# Patient Record
Sex: FEMALE | Race: WHITE | NOT HISPANIC OR LATINO | Employment: OTHER | URBAN - METROPOLITAN AREA
[De-identification: names, ages, dates, MRNs, and addresses within clinical notes are randomized per-mention and may not be internally consistent; named-entity substitution may affect disease eponyms.]

---

## 2017-06-15 ENCOUNTER — RECORDS - HEALTHEAST (OUTPATIENT)
Dept: LAB | Facility: CLINIC | Age: 77
End: 2017-06-15

## 2017-06-15 LAB
CHOLEST SERPL-MCNC: 219 MG/DL
FASTING STATUS PATIENT QL REPORTED: YES
HDLC SERPL-MCNC: 72 MG/DL
LDLC SERPL CALC-MCNC: 129 MG/DL
TRIGL SERPL-MCNC: 91 MG/DL

## 2018-12-11 ENCOUNTER — RECORDS - HEALTHEAST (OUTPATIENT)
Dept: LAB | Facility: CLINIC | Age: 78
End: 2018-12-11

## 2018-12-11 LAB
ALBUMIN SERPL-MCNC: 4.1 G/DL (ref 3.5–5)
ALP SERPL-CCNC: 106 U/L (ref 45–120)
ALT SERPL W P-5'-P-CCNC: 18 U/L (ref 0–45)
ANION GAP SERPL CALCULATED.3IONS-SCNC: 9 MMOL/L (ref 5–18)
AST SERPL W P-5'-P-CCNC: 26 U/L (ref 0–40)
BILIRUB SERPL-MCNC: 0.7 MG/DL (ref 0–1)
BUN SERPL-MCNC: 15 MG/DL (ref 8–28)
CALCIUM SERPL-MCNC: 9.6 MG/DL (ref 8.5–10.5)
CHLORIDE BLD-SCNC: 100 MMOL/L (ref 98–107)
CHOLEST SERPL-MCNC: 219 MG/DL
CO2 SERPL-SCNC: 28 MMOL/L (ref 22–31)
CREAT SERPL-MCNC: 0.84 MG/DL (ref 0.6–1.1)
FASTING STATUS PATIENT QL REPORTED: NO
GFR SERPL CREATININE-BSD FRML MDRD: >60 ML/MIN/1.73M2
GLUCOSE BLD-MCNC: 102 MG/DL (ref 70–125)
HDLC SERPL-MCNC: 84 MG/DL
LDLC SERPL CALC-MCNC: 119 MG/DL
POTASSIUM BLD-SCNC: 4 MMOL/L (ref 3.5–5)
PROT SERPL-MCNC: 7.7 G/DL (ref 6–8)
SODIUM SERPL-SCNC: 137 MMOL/L (ref 136–145)
TRIGL SERPL-MCNC: 79 MG/DL

## 2020-01-17 ENCOUNTER — RECORDS - HEALTHEAST (OUTPATIENT)
Dept: LAB | Facility: CLINIC | Age: 80
End: 2020-01-17

## 2020-01-17 LAB
ALBUMIN SERPL-MCNC: 3.7 G/DL (ref 3.5–5)
ALP SERPL-CCNC: 91 U/L (ref 45–120)
ALT SERPL W P-5'-P-CCNC: 14 U/L (ref 0–45)
ANION GAP SERPL CALCULATED.3IONS-SCNC: 9 MMOL/L (ref 5–18)
AST SERPL W P-5'-P-CCNC: 21 U/L (ref 0–40)
BILIRUB SERPL-MCNC: 0.4 MG/DL (ref 0–1)
BUN SERPL-MCNC: 11 MG/DL (ref 8–28)
CALCIUM SERPL-MCNC: 8.7 MG/DL (ref 8.5–10.5)
CHLORIDE BLD-SCNC: 104 MMOL/L (ref 98–107)
CO2 SERPL-SCNC: 26 MMOL/L (ref 22–31)
CREAT SERPL-MCNC: 0.72 MG/DL (ref 0.6–1.1)
GFR SERPL CREATININE-BSD FRML MDRD: >60 ML/MIN/1.73M2
GLUCOSE BLD-MCNC: 96 MG/DL (ref 70–125)
POTASSIUM BLD-SCNC: 4.5 MMOL/L (ref 3.5–5)
PROT SERPL-MCNC: 6.8 G/DL (ref 6–8)
SODIUM SERPL-SCNC: 139 MMOL/L (ref 136–145)

## 2021-04-15 ENCOUNTER — RECORDS - HEALTHEAST (OUTPATIENT)
Dept: LAB | Facility: CLINIC | Age: 81
End: 2021-04-15

## 2021-04-15 LAB
ALBUMIN SERPL-MCNC: 4.2 G/DL (ref 3.5–5)
ALP SERPL-CCNC: 77 U/L (ref 45–120)
ALT SERPL W P-5'-P-CCNC: 11 U/L (ref 0–45)
ANION GAP SERPL CALCULATED.3IONS-SCNC: 14 MMOL/L (ref 5–18)
AST SERPL W P-5'-P-CCNC: 20 U/L (ref 0–40)
BILIRUB SERPL-MCNC: 0.4 MG/DL (ref 0–1)
BUN SERPL-MCNC: 12 MG/DL (ref 8–28)
CALCIUM SERPL-MCNC: 8.8 MG/DL (ref 8.5–10.5)
CHLORIDE BLD-SCNC: 99 MMOL/L (ref 98–107)
CHOLEST SERPL-MCNC: 218 MG/DL
CO2 SERPL-SCNC: 22 MMOL/L (ref 22–31)
CREAT SERPL-MCNC: 0.82 MG/DL (ref 0.6–1.1)
FASTING STATUS PATIENT QL REPORTED: ABNORMAL
GFR SERPL CREATININE-BSD FRML MDRD: >60 ML/MIN/1.73M2
GLUCOSE BLD-MCNC: 100 MG/DL (ref 70–125)
HDLC SERPL-MCNC: 66 MG/DL
LDLC SERPL CALC-MCNC: 134 MG/DL
POTASSIUM BLD-SCNC: 4.3 MMOL/L (ref 3.5–5)
PROT SERPL-MCNC: 7.3 G/DL (ref 6–8)
SODIUM SERPL-SCNC: 135 MMOL/L (ref 136–145)
TRIGL SERPL-MCNC: 90 MG/DL

## 2021-04-16 LAB — 25(OH)D3 SERPL-MCNC: 26.5 NG/ML (ref 30–80)

## 2021-05-25 ENCOUNTER — RECORDS - HEALTHEAST (OUTPATIENT)
Dept: ADMINISTRATIVE | Facility: CLINIC | Age: 81
End: 2021-05-25

## 2021-05-26 ENCOUNTER — RECORDS - HEALTHEAST (OUTPATIENT)
Dept: ADMINISTRATIVE | Facility: CLINIC | Age: 81
End: 2021-05-26

## 2021-05-27 ENCOUNTER — RECORDS - HEALTHEAST (OUTPATIENT)
Dept: ADMINISTRATIVE | Facility: CLINIC | Age: 81
End: 2021-05-27

## 2021-05-28 ENCOUNTER — RECORDS - HEALTHEAST (OUTPATIENT)
Dept: ADMINISTRATIVE | Facility: CLINIC | Age: 81
End: 2021-05-28

## 2021-05-29 ENCOUNTER — RECORDS - HEALTHEAST (OUTPATIENT)
Dept: ADMINISTRATIVE | Facility: CLINIC | Age: 81
End: 2021-05-29

## 2021-05-30 ENCOUNTER — RECORDS - HEALTHEAST (OUTPATIENT)
Dept: ADMINISTRATIVE | Facility: CLINIC | Age: 81
End: 2021-05-30

## 2021-06-12 ENCOUNTER — COMMUNICATION - HEALTHEAST (OUTPATIENT)
Dept: SCHEDULING | Facility: CLINIC | Age: 81
End: 2021-06-12

## 2021-06-12 ENCOUNTER — APPOINTMENT (OUTPATIENT)
Dept: PHYSICAL THERAPY | Facility: CLINIC | Age: 81
DRG: 536 | End: 2021-06-12
Attending: INTERNAL MEDICINE
Payer: MEDICARE

## 2021-06-12 ENCOUNTER — APPOINTMENT (OUTPATIENT)
Dept: GENERAL RADIOLOGY | Facility: CLINIC | Age: 81
DRG: 536 | End: 2021-06-12
Attending: ORTHOPAEDIC SURGERY
Payer: MEDICARE

## 2021-06-12 ENCOUNTER — TRANSFERRED RECORDS (OUTPATIENT)
Dept: HEALTH INFORMATION MANAGEMENT | Facility: CLINIC | Age: 81
End: 2021-06-12

## 2021-06-12 ENCOUNTER — HOSPITAL ENCOUNTER (INPATIENT)
Facility: CLINIC | Age: 81
LOS: 3 days | Discharge: NURSING, REHAB OR LONG-TERM CARE | DRG: 536 | End: 2021-06-16
Attending: INTERNAL MEDICINE | Admitting: INTERNAL MEDICINE
Payer: MEDICARE

## 2021-06-12 DIAGNOSIS — S32.89XA CLOSED FRACTURE OF OTHER PARTS OF PELVIS, INITIAL ENCOUNTER (H): Primary | ICD-10-CM

## 2021-06-12 PROBLEM — F41.1 ANXIETY STATE: Status: ACTIVE | Noted: 2021-06-12

## 2021-06-12 PROBLEM — E78.5 HYPERLIPIDEMIA: Status: ACTIVE | Noted: 2021-06-12

## 2021-06-12 PROBLEM — M81.0 OSTEOPOROSIS: Status: ACTIVE | Noted: 2021-06-12

## 2021-06-12 PROBLEM — I10 BENIGN ESSENTIAL HYPERTENSION: Status: ACTIVE | Noted: 2021-06-12

## 2021-06-12 PROBLEM — S32.9XXA PELVIC FRACTURE (H): Status: ACTIVE | Noted: 2021-06-12

## 2021-06-12 PROBLEM — G89.29 CHRONIC PAIN: Status: ACTIVE | Noted: 2021-06-12

## 2021-06-12 PROBLEM — H04.123 DRY EYES: Status: ACTIVE | Noted: 2021-06-12

## 2021-06-12 PROBLEM — F33.9 RECURRENT MAJOR DEPRESSION (H): Status: ACTIVE | Noted: 2021-06-12

## 2021-06-12 PROBLEM — H91.90 HEARING LOSS: Status: ACTIVE | Noted: 2021-06-12

## 2021-06-12 PROCEDURE — 250N000013 HC RX MED GY IP 250 OP 250 PS 637: Performed by: INTERNAL MEDICINE

## 2021-06-12 PROCEDURE — 97161 PT EVAL LOW COMPLEX 20 MIN: CPT | Mod: GP | Performed by: PHYSICAL THERAPIST

## 2021-06-12 PROCEDURE — 97530 THERAPEUTIC ACTIVITIES: CPT | Mod: GP | Performed by: PHYSICAL THERAPIST

## 2021-06-12 PROCEDURE — 99220 PR INITIAL OBSERVATION CARE,LEVEL III: CPT | Performed by: INTERNAL MEDICINE

## 2021-06-12 PROCEDURE — G0378 HOSPITAL OBSERVATION PER HR: HCPCS

## 2021-06-12 PROCEDURE — 72170 X-RAY EXAM OF PELVIS: CPT

## 2021-06-12 RX ORDER — ACETAMINOPHEN 325 MG/1
650 TABLET ORAL EVERY 4 HOURS PRN
Status: DISCONTINUED | OUTPATIENT
Start: 2021-06-12 | End: 2021-06-16 | Stop reason: HOSPADM

## 2021-06-12 RX ORDER — PAROXETINE 20 MG/1
20 TABLET, FILM COATED ORAL EVERY MORNING
COMMUNITY

## 2021-06-12 RX ORDER — TRIAMTERENE/HYDROCHLOROTHIAZID 37.5-25 MG
1 TABLET ORAL DAILY
Status: DISCONTINUED | OUTPATIENT
Start: 2021-06-12 | End: 2021-06-13

## 2021-06-12 RX ORDER — NALOXONE HYDROCHLORIDE 0.4 MG/ML
0.2 INJECTION, SOLUTION INTRAMUSCULAR; INTRAVENOUS; SUBCUTANEOUS
Status: DISCONTINUED | OUTPATIENT
Start: 2021-06-12 | End: 2021-06-16 | Stop reason: HOSPADM

## 2021-06-12 RX ORDER — ONDANSETRON 2 MG/ML
4 INJECTION INTRAMUSCULAR; INTRAVENOUS EVERY 6 HOURS PRN
Status: DISCONTINUED | OUTPATIENT
Start: 2021-06-12 | End: 2021-06-16 | Stop reason: HOSPADM

## 2021-06-12 RX ORDER — PAROXETINE 20 MG/1
20 TABLET, FILM COATED ORAL DAILY
Status: DISCONTINUED | OUTPATIENT
Start: 2021-06-12 | End: 2021-06-16 | Stop reason: HOSPADM

## 2021-06-12 RX ORDER — NALOXONE HYDROCHLORIDE 0.4 MG/ML
0.4 INJECTION, SOLUTION INTRAMUSCULAR; INTRAVENOUS; SUBCUTANEOUS
Status: DISCONTINUED | OUTPATIENT
Start: 2021-06-12 | End: 2021-06-16 | Stop reason: HOSPADM

## 2021-06-12 RX ORDER — ALENDRONATE SODIUM 70 MG/1
70 TABLET ORAL
COMMUNITY

## 2021-06-12 RX ORDER — TRIAMTERENE AND HYDROCHLOROTHIAZIDE 37.5; 25 MG/1; MG/1
1 CAPSULE ORAL EVERY MORNING
COMMUNITY

## 2021-06-12 RX ORDER — ACETAMINOPHEN 650 MG/1
650 SUPPOSITORY RECTAL EVERY 4 HOURS PRN
Status: DISCONTINUED | OUTPATIENT
Start: 2021-06-12 | End: 2021-06-16 | Stop reason: HOSPADM

## 2021-06-12 RX ORDER — OXYCODONE HYDROCHLORIDE 5 MG/1
5 TABLET ORAL EVERY 4 HOURS PRN
Status: DISCONTINUED | OUTPATIENT
Start: 2021-06-12 | End: 2021-06-16 | Stop reason: HOSPADM

## 2021-06-12 RX ORDER — ATORVASTATIN CALCIUM 10 MG/1
10 TABLET, FILM COATED ORAL DAILY
COMMUNITY

## 2021-06-12 RX ORDER — ATORVASTATIN CALCIUM 10 MG/1
10 TABLET, FILM COATED ORAL EVERY EVENING
Status: DISCONTINUED | OUTPATIENT
Start: 2021-06-12 | End: 2021-06-16 | Stop reason: HOSPADM

## 2021-06-12 RX ORDER — ONDANSETRON 4 MG/1
4 TABLET, ORALLY DISINTEGRATING ORAL EVERY 6 HOURS PRN
Status: DISCONTINUED | OUTPATIENT
Start: 2021-06-12 | End: 2021-06-16 | Stop reason: HOSPADM

## 2021-06-12 RX ADMIN — TRIAMTERENE AND HYDROCHLOROTHIAZIDE 1 TABLET: 37.5; 25 TABLET ORAL at 09:03

## 2021-06-12 RX ADMIN — OXYCODONE HYDROCHLORIDE 5 MG: 5 TABLET ORAL at 20:21

## 2021-06-12 RX ADMIN — ACETAMINOPHEN 650 MG: 325 TABLET, FILM COATED ORAL at 20:21

## 2021-06-12 RX ADMIN — ATORVASTATIN CALCIUM 10 MG: 10 TABLET, FILM COATED ORAL at 20:20

## 2021-06-12 RX ADMIN — PAROXETINE HYDROCHLORIDE 20 MG: 20 TABLET, FILM COATED ORAL at 09:03

## 2021-06-12 RX ADMIN — OXYCODONE HYDROCHLORIDE 5 MG: 5 TABLET ORAL at 09:03

## 2021-06-12 ASSESSMENT — COLUMBIA-SUICIDE SEVERITY RATING SCALE - C-SSRS
2. HAVE YOU ACTUALLY HAD ANY THOUGHTS OF KILLING YOURSELF IN THE PAST MONTH?: NO
1. IN THE PAST MONTH, HAVE YOU WISHED YOU WERE DEAD OR WISHED YOU COULD GO TO SLEEP AND NOT WAKE UP?: NO

## 2021-06-12 NOTE — PLAN OF CARE
[facility]      OUTPATIENT PHYSICAL THERAPY EVALUATION  PLAN OF TREATMENT FOR OUTPATIENT REHABILITATION  (COMPLETE FOR INITIAL CLAIMS ONLY)  Patient's Last Name, First Name, M.I.  YOB: 1940  KevinAlivia MATTHEWS                        Provider's Name  [facility] Medical Record No.  [id number]                               Onset Date:  06/11/21   Start of Care Date:  06/11/21      Type:     _X_PT   ___OT   ___SLP Medical Diagnosis:  L pelvic fracture post fall                        PT Diagnosis:  Unable to ambulate   Visits from SOC:  1   _________________________________________________________________________________  Plan of Treatment/Functional Goals    Planned Interventions: balance training, bed mobility training, gait training, home exercise program, neuromuscular re-education, patient/family education, postural re-education, stair training, ROM (range of motion), strengthening, stretching, transfer training     Goals: See Physical Therapy Goals on Care Plan in [facility] electronic health record.    Therapy Frequency: 3x/week  Predicted Duration of Therapy Intervention: 1 week  _________________________________________________________________________________    I CERTIFY THE NEED FOR THESE SERVICES FURNISHED UNDER        THIS PLAN OF TREATMENT AND WHILE UNDER MY CARE     (Physician co-signature of this document indicates review and certification of the therapy plan).              Certification date from: 06/12/21, Certification date to: 06/18/21    Referring Physician: Thomas Reilly MD            Initial Assessment        See Physical Therapy evaluation dated 06/11/21 in Epic electronic health record.

## 2021-06-12 NOTE — CONSULTS
[facility]    Orthopedics Consultation    Date of Admission:  6/12/2021    Assessment & Plan   Alivia Brown is a 80 year old female who was admitted on 6/12/2021. I was asked to see the patient for left superior and inferior pubic rami fractures and left wrist pain status post ORIF left distal radius remotely.  No sacral fractures on LS Spine CT  Indicated for nonoperative management  Will obtain AP/Inlet/Outlet X-Rays for completion  WBAT bilateral UEs and LEs  ROM as tolerated bilateral UEs and LEs  Wrist brace PRN for comfort  Mat remove wrist brace as needed  Follow-up in 4-6 weeks if needed    Active Problems:    Pelvic fracture (H)    Kang Haque MD     Code Status    No CPR- Do NOT Intubate    Reason for Consult   Reason for consult: I was asked by Hospital Medicine to evaluate this patient for Pelvic Fractures and Left Wrist Pain.    Primary Care Physician   No primary care provider on file.    Chief Complaint   Pelvic Pain and Left Wrist Pain    History is obtained from the patient and electronic health record    History of Present Illness   Lawrence is an 80-year-old female with history of anxiety, depression, hypertension, hyperlipidemia, hearing loss, osteoporosis, was transferred here from [facility] because of fall and left pelvic fracture.  According to the patient, she was out with friends and had 3 drinks of vodka was coming back home and fell, which she thinks is a mechanical fall and landed on her left side, hitting her left wrist and left hip.  She did not hit her head, has not lost consciousness.  She did not feel dizzy or lightheaded before she fell down.  She went to the ER and found to have left pelvic fractures.  As the pain was uncontrolled and unable to get up so she was transferred to [facility] for further management for unclear reasons.  At this time, she denies any chest pain, shortness of breath, orthopnea, PND, palpitation.  No headache,  dizziness, lightheadedness.  No fever, chills or cough.  No dysuria, hematuria, constipation, diarrhea at this time.  Denies numbness, tingling or weakness. Rest of the review of system is negative at this time. Pain is isolated to anterior pelvis and left wrist. She has history significant for ORIF left wrist remotely with no reported complications.    Past Medical History   I have reviewed this patient's medical history and updated it with pertinent information if needed.   Past Medical History:   Diagnosis Date     Anxiety state 6/12/2021     Benign essential hypertension 6/12/2021     Hearing loss 6/12/2021     Hyperlipidemia 6/12/2021     Osteoporosis 6/12/2021     Recurrent major depression (H) 6/12/2021       Past Surgical History   I have reviewed this patient's surgical history and updated it with pertinent information if needed.  Past Surgical History:   Procedure Laterality Date     WRIST SURGERY Left        Prior to Admission Medications   Prior to Admission Medications   Prescriptions Last Dose Informant Patient Reported? Taking?   CALCIUM PO   Yes Yes   Sig: Take by mouth daily   PARoxetine (PAXIL) 20 MG tablet   Yes Yes   Sig: Take 20 mg by mouth every morning   VITAMIN D, CHOLECALCIFEROL, PO   Yes Yes   Sig: Take by mouth daily   alendronate (FOSAMAX) 70 MG tablet 6/9/2021  Yes Yes   Sig: Take 70 mg by mouth every 7 days   atorvastatin (LIPITOR) 10 MG tablet   Yes Yes   Sig: Take 10 mg by mouth daily   triamterene-HCTZ (DYAZIDE) 37.5-25 MG capsule   Yes Yes   Sig: Take 1 capsule by mouth every morning      Facility-Administered Medications: None     Allergies   No Known Allergies    Social History   I have reviewed this patient's social history and updated it with pertinent information if needed. Alivia Brown  reports that she has never smoked. She has never used smokeless tobacco. She reports current alcohol use. She reports that she does not use drugs.    Family History   I have reviewed this  patient's family history and updated it with pertinent information if needed.   History reviewed. No pertinent family history.    Review of Systems   The 10 point Review of Systems is negative other than noted in the HPI or here.     Physical Exam   Temp: 98.8  F (37.1  C) Temp src: Axillary BP: 129/75 Pulse: 79   Resp: 16 SpO2: 94 % O2 Device: None (Room air)    Vital Signs with Ranges  Temp:  [98.2  F (36.8  C)-98.8  F (37.1  C)] 98.8  F (37.1  C)  Pulse:  [79-80] 79  Resp:  [16] 16  BP: (129-140)/(75-77) 129/75  SpO2:  [94 %-98 %] 94 %  0 lbs 0 oz    Constitutional: awake, alert, cooperative, no apparent distress, and appears stated age  Eyes: extra-ocular muscles intact  ENT: normocepalic, atraumatic without obvious abnormality  Hematologic / Lymphatic: no lymphedema   Respiratory: no increased work of breathing  Skin: no bruising or bleeding, normal skin color, texture, turgor and no redness, warmth, or swelling  Musculoskeletal: There is no redness, warmth, or swelling of the joints.  Full range of motion noted.  Motor strength is 5 out of 5 all extremities bilaterally.  Tone is normal.  No deformity.  Neurologic: CMS intact distally.  Intact sensation in lateral femoral cutaneous, saphenous, sural, superficial peroneal, deep peroneal and tibial distributions.    Motor intact in quadriceps, hamstrings, abductors, tibialis anterior, extensor hallucis longus, and gastrocsoleus.   Pedal pulses intact and capillary refill brisk.  Mental Status Exam:  Level of Alertness:   awake  Orientation:   person, place, time  Memory:   normal  Fund of Knowledge:  normal  Attention/Concentration:  normal  Language:  normal  Neuropsychiatric: General: normal, calm and normal eye contact  Level of consciousness: alert / normal  Affect: normal  Orientation: oriented to self, place, time and situation  Memory and insight: normal, memory for past and recent events intact and thought process normal    Data      XR Wrist Left 3 or  More VWS (06/12/2021 2:42 AM CDT)   Impressions Performed At   No acute fracture or dislocation. Sideplate crosses an old distal radius fracture. 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   HE IMG RESULT            XR Wrist Left 3 or More VWS (06/12/2021 2:42 AM CDT)   Narrative Performed At   EXAM: XR WRIST LEFT 3 OR MORE VWS    LOCATION: [facility]    DATE/TIME: 6/12/2021 2:42 AM        INDICATION: FOOSH    COMPARISON: None.       HE IMG RESULT        XR Wrist Left 3 or More VWS (06/12/2021 2:42 AM CDT)   Procedure Note   Interface, Rad Results In - 06/12/2021 2:59 AM CDT    Formatting of this note might be different from the original.  EXAM: XR WRIST LEFT 3 OR MORE VWS  LOCATION: [facility]  DATE/TIME: 6/12/2021 2:42 AM    INDICATION: FOOSH  COMPARISON: None.    IMPRESSION:   No acute fracture or dislocation. Sideplate crosses an old distal radius fracture. 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              XR Wrist Left 3 or More VWS (06/12/2021 2:42 AM CDT)   Performing Organization Address City/State/ZIP Code Phone Number   HE IMG RESULT            Back to top of Results       XR Sacrum and Coccyx 2 or More VWS (06/12/2021 2:41 AM CDT)  XR Sacrum and Coccyx 2 or More VWS (06/12/2021 2:41 AM CDT)   Specimen              XR Sacrum and Coccyx 2 or More VWS (06/12/2021 2:41 AM CDT)   Impressions Performed At   There are fractures of the left superior and inferior pubic rami. No sacral fracture. 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     HE IMG RESULT            XR Sacrum and Coccyx 2 or More VWS (06/12/2021 2:41 AM CDT)   Narrative Performed At   EXAM: XR SACRUM AND COCCYX 2 OR MORE VWS    LOCATION: [facility]    DATE/TIME: 6/12/2021 2:41 AM        INDICATION: Fall, pain    COMPARISON: None.       HE IMG RESULT        XR Sacrum and Coccyx 2 or More VWS (06/12/2021 2:41 AM CDT)   Procedure Note   Interface, Rad Results In - 06/12/2021 2:59 AM CDT    Formatting  of this note might be different from the original.  EXAM: XR SACRUM AND COCCYX 2 OR MORE VWS  LOCATION: [facility]  DATE/TIME: 6/12/2021 2:41 AM    INDICATION: Fall, pain  COMPARISON: None.    IMPRESSION:   There are fractures of the left superior and inferior pubic rami. No sacral fracture. 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               XR Sacrum and Coccyx 2 or More VWS (06/12/2021 2:41 AM CDT)   Performing Organization Address City/State/ZIP Code Phone Number   HE IMG RESULT            Back to top of Results       CT Lumbar Spine Without Contrast (06/12/2021 2:01 AM CDT)  CT Lumbar Spine Without Contrast (06/12/2021 2:01 AM CDT)   Specimen              CT Lumbar Spine Without Contrast (06/12/2021 2:01 AM CDT)   Impressions Performed At   1.  No fracture or posttraumatic subluxation.    2.  Degenerative changes, as described.   HE IMG RESULT            CT Lumbar Spine Without Contrast (06/12/2021 2:01 AM CDT)   Narrative Performed At   EXAM: CT LUMBAR SPINE WO CONTRAST    LOCATION: [facility]    DATE/TIME: 6/12/2021 2:01 AM        INDICATION: Back injury, acute on chronic sciatica    COMPARISON: None.    TECHNIQUE: Routine CT Lumbar Spine without IV contrast. Multiplanar reformats. Dose reduction techniques were used.         FINDINGS:    VERTEBRA: Normal vertebral body heights. Slight left lumbar curve. 4 mm anterior subluxation L4 on L5. No fracture or posttraumatic subluxation.         CANAL/FORAMINA: Moderate degenerative changes with mild canal narrowing at L3-L4 and moderate to marked at L4-L5. Marked right and moderate left foraminal narrowing at L4-L5, mild to moderate left at L3-L4.        PARASPINAL: No extraspinal abnormality.

## 2021-06-12 NOTE — PHARMACY-ADMISSION MEDICATION HISTORY
Pharmacy Medication History  Admission medication history interview status for the 6/12/2021  admission is complete. See EPIC admission navigator for prior to admission medications     Location of Interview: Patient room  Medication history sources: Patient and Pharmacy ([facility] [place])    Significant changes made to the medication list:  -Added all medications.    In the past week, patient estimated taking medication this percent of the time: greater than 90%    Additional medication history information:   -She also takes another vitamin that she could not remember name of & uses OTC dry eye drop occasionally.    Medication reconciliation completed by provider prior to medication history? Yes    Time spent in this activity: 15 min    Prior to Admission medications    Medication Sig Last Dose Taking? Auth Provider   alendronate (FOSAMAX) 70 MG tablet Take 70 mg by mouth every 7 days 6/9/2021 Yes Unknown, Entered By History   atorvastatin (LIPITOR) 10 MG tablet Take 10 mg by mouth daily  Yes Unknown, Entered By History   CALCIUM PO Take by mouth daily  Yes Unknown, Entered By History   PARoxetine (PAXIL) 20 MG tablet Take 20 mg by mouth every morning  Yes Unknown, Entered By History   triamterene-HCTZ (DYAZIDE) 37.5-25 MG capsule Take 1 capsule by mouth every morning  Yes Unknown, Entered By History   VITAMIN D, CHOLECALCIFEROL, PO Take by mouth daily  Yes Unknown, Entered By History       The information provided in this note is only as accurate as the sources available at the time of update(s)

## 2021-06-12 NOTE — PROGRESS NOTES
"   06/12/21 1329   Quick Adds   Type of Visit Initial PT Evaluation   Living Environment   People in home spouse   Current Living Arrangements condominium   Home Accessibility stairs to enter home   Number of Stairs, Main Entrance 1   Transportation Anticipated family or friend will provide;car, drives self   Living Environment Comments Lives in a condo with her hsuband, she normally drives;  drives as well   Self-Care   Usual Activity Tolerance good   Current Activity Tolerance fair   Regular Exercise Yes   Activity/Exercise Type walking   Exercise Amount/Frequency daily;1 hr;greater than 1 hr   Equipment Currently Used at Home none   Activity/Exercise/Self-Care Comment Walks 3 miles per day, weather permitting.   Disability/Function   Fall history within last six months yes   Number of times patient has fallen within last six months 1   Change in Functional Status Since Onset of Current Illness/Injury yes   General Information   Onset of Illness/Injury or Date of Surgery 06/11/21   Referring Physician Thomas Reilly MD   Patient/Family Therapy Goals Statement (PT) Home if she could, but understands rehab is a likely scenerio.    Pertinent History of Current Problem (include personal factors and/or comorbidities that impact the POC) 81 yo female adm s/p Fall from her laundryroom to the garage (1 step). Reports having drinks and came home, then mis-stepped. Now with L pelvic fracture, R wrist \"sprain\" per pt.    Existing Precautions/Restrictions fall   Weight-Bearing Status - LLE weight-bearing as tolerated   Weight-Bearing Status - RLE weight-bearing as tolerated   General Observations L splint at the wrist   Cognition   Orientation Status (Cognition) oriented x 4   Affect/Mental Status (Cognition) WNL   Follows Commands (Cognition) follows one-step commands;75-90% accuracy   Cognitive Status Comments Pt a little slow to respond at times, suspect pt's pain meds may be playing a role.   Pain Assessment "   Patient Currently in Pain Yes, see Vital Sign flowsheet   Integumentary/Edema   Integumentary/Edema Comments bruising ove rthe L elbow, R forearm.   Posture    Posture Forward head position;Protracted shoulders;Kyphosis   Range of Motion (ROM)   ROM Comment Limited range, but able to flex/ext at the ankle and the knee-liimted range with knee extension.   Strength   Strength Comments Demonstrates limited antigavity strenght, pain limits mobility.   Bed Mobility   Comment (Bed Mobility) Sup>sit Max A up to sitting.   Transfers   Transfer Safety Comments Unable to stand, able to weight shift and push via the UEs to assist with lateral scoot to the R-unable to stand.    Gait/Stairs (Locomotion)   Distance in Feet (Required for LE Total Joints) Pt unable to stand; therefore, unable to ambulate. Limited by pain, fatigue and generalized weakness.    Balance   Balance Comments Sitting balance, leans to the R significantly-away from painful side.   Clinical Impression   Criteria for Skilled Therapeutic Intervention yes, treatment indicated   PT Diagnosis (PT) Unable to ambulate   Influenced by the following impairments Pain, fatigue, weakness, decreased balance, fall risk   Functional limitations due to impairments Decreaesd functional independence   Clinical Presentation Stable/Uncomplicated   Clinical Presentation Rationale see MR    Clinical Decision Making (Complexity) low complexity   Therapy Frequency (PT) 3x/week   Predicted Duration of Therapy Intervention (days/wks) 1 week   Planned Therapy Interventions (PT) balance training;bed mobility training;gait training;home exercise program;neuromuscular re-education;patient/family education;postural re-education;stair training;ROM (range of motion);strengthening;stretching;transfer training   Risk & Benefits of therapy have been explained evaluation/treatment results reviewed;care plan/treatment goals reviewed;risks/benefits reviewed;current/potential barriers  reviewed;participants voiced agreement with care plan;participants included;patient   PT Discharge Planning    PT Discharge Recommendation (DC Rec) Transitional Care Facility   PT Rationale for DC Rec Pt requires Moderate to Maxium assist with all funcitonal mobility (Pt able to do ~ 25-50% of the work). Limited by pain, weakness, fatigue, decreased balance.  Pt has one step to enter her home, she does live with her . At this time rec TCU for ongoing skilled rehab in order to improve independence and safety with functional mobility.   PT Brief overview of current status  Max A with increased time sup>sit with HOB elevated. Sit<>Stand attempts, pt unable to stand. Lateral scoot at EOB, Mod A via use of sheet to aide in scoot.   Total Evaluation Time   Total Evaluation Time (Minutes) 10

## 2021-06-12 NOTE — H&P
[facility]    History and Physical  Hospitalist       Date of Admission:  6/12/2021    Assessment & Plan     This is an 80-year-old female with history of anxiety, depression, hypertension, hyperlipidemia, hearing loss, osteoporosis, was transferred here from [facility] because of fall and left pelvic fracture.      ASSESSMENT AND PLAN:    1.  Mechanical fall with left pelvic fracture:  This is an 80-year-old female, not on any anticoagulation, presented with mechanical fall,  has left pelvic fracture with pain.  It is now much better controlled at this time.  We will admit her for observation and have PT, OT evaluate the patient.  Weightbearing as tolerated.  Have orthopedic evaluate the patient.  Keep her on Tylenol, oxycodone for pain control.  /care coordinator consult for safe disposition.  2.  HYPERTENSION/HYPERLIPIDEMIA:  She is on triamterene/hydrochlorothiazide 37.5/25 mg daily.  We will continue with that.  Keep her on Lipitor 10 mg daily.  History of anxiety, depression, well controlled with Paxil 20 mg daily.  We will continue with that.   3.  HISTORY OF OSTEOPOROSIS:  On Fosamax.  We will hold it while she is in the hospital.  DVT prophylaxis with SCDs.    CODE STATUS:  DNR/DNI.    The case discussed with nursing staff taking care of the patient.         Thomas Reilly MD    DVT Prophylaxis: Pneumatic Compression Devices  Code Status: DNR / DNI    Disposition: Expected discharge in 1 days once stable.    Thomas Reilly MD    Primary Care Physician   No primary care provider on file.    Chief Complaint   Fall with left hip pain     History is obtained from the patient    History of Present Illness   Admitted: 06/12/2021    HISTORY OF PRESENT ILLNESS:  This is an 80-year-old female with history of anxiety, depression, hypertension, hyperlipidemia, hearing loss, osteoporosis, was transferred here from [facility] because of fall and left pelvic fracture.    According to  the patient, she was out with friends and had 3 drinks of vodka was coming back home and fell, which she thinks is a mechanical fall and landed on her left side, hitting her left wrist and left hip.  She did not hit her head, has not lost consciousness.  She did not feel dizzy or lightheaded before she fell down.  She went to the ER and found to have left pelvic fracture.  As the pain was uncontrolled and unable to get up so she was transferred to [facility] for further management.    At this time, she denies any chest pain, shortness of breath, orthopnea, PND, palpitation.  No headache, dizziness, lightheadedness.  No fever, chills or cough.  No dysuria, hematuria, constipation, diarrhea at this time.  Rest of the review of system is negative at this time.      Past Medical History    I have reviewed this patient's medical history and updated it with pertinent information if needed.   Past Medical History:   Diagnosis Date     Anxiety state 6/12/2021     Benign essential hypertension 6/12/2021     Hearing loss 6/12/2021     Hyperlipidemia 6/12/2021     Osteoporosis 6/12/2021     Recurrent major depression (H) 6/12/2021       Past Surgical History   I have reviewed this patient's surgical history and updated it with pertinent information if needed.  Past Surgical History:   Procedure Laterality Date     WRIST SURGERY Left        Prior to Admission Medications   None     Allergies   No Known Allergies    Social History   I have reviewed this patient's social history and updated it with pertinent information if needed. Alivia BYRON Brown  reports that she has never smoked. She has never used smokeless tobacco. She reports current alcohol use. She reports that she does not use drugs.    Family History   I have reviewed this patient's family history and updated it with pertinent information if needed.   History reviewed. No pertinent family history.    Review of Systems   CONSTITUTIONAL:  negative  EYES:   negative  HEENT:  negative  RESPIRATORY:  negative  CARDIOVASCULAR:  negative  GASTROINTESTINAL:  negative  GENITOURINARY:  negative  INTEGUMENT/BREAST:  negative  HEMATOLOGIC/LYMPHATIC:  negative  ALLERGIC/IMMUNOLOGIC:  negative  ENDOCRINE:  negative  MUSCULOSKELETAL:  positive for  Back pain and left hip pain   NEUROLOGICAL:  negative  BEHAVIOR/PSYCH:  negative    Physical Exam   Temp: 98.2  F (36.8  C) Temp src: Oral BP: (!) 140/77 Pulse: 80   Resp: 16 SpO2: 98 % O2 Device: None (Room air)    Vital Signs with Ranges  Temp:  [98.2  F (36.8  C)] 98.2  F (36.8  C)  Pulse:  [80] 80  Resp:  [16] 16  BP: (140)/(77) 140/77  SpO2:  [98 %] 98 %  0 lbs 0 oz    Constitutional: Awake, alert, cooperative, no apparent distress.  Eyes: Conjunctiva and pupils examined and normal.  HEENT: Moist mucous membranes, normal dentition.  Respiratory: Clear to auscultation bilaterally, no crackles or wheezing.  Cardiovascular: Regular rate and rhythm, normal S1 and S2, and no murmur noted.  GI: Soft, non-distended, non-tender, normal bowel sounds.  Lymph/Hematologic: No anterior cervical or supraclavicular adenopathy.  Skin: No rashes, no cyanosis, no edema.  Musculoskeletal: No joint swelling, erythema or tenderness. Has pain to left hip, no deformity at left hip or thigh, both leg same length   Neurologic: Cranial nerves 2-12 intact, normal strength and sensation.  Psychiatric: Alert, oriented to person, place and time, no obvious anxiety or depression.    Data   Data reviewed today:  I personally reviewed no images or EKG's today.  No lab results found in last 7 days.    No results found for this or any previous visit (from the past 24 hour(s)).     Asymptomatic SARS-CoV-2 (COVID-19)-PCR (06/12/2021 3:33 AM CDT)  Asymptomatic SARS-CoV-2 (COVID-19)-PCR (06/12/2021 3:33 AM CDT)   Component Value Ref Range Performed At Pathologist Signature   SARS-CoV-2 PCR Result Negative  Comment:   Testing was performed using the jimmy  SARS-CoV-2 &  Influenza A/B Assay on the jimmy  Heather  System.    This test should be ordered for the detection of SARS-COV-2 in individuals who meet SARS-CoV-2 clinical and/or epidemiological criteria. Test performance is unknown in asymptomatic patients.  This test is for in vitro diagnostic use under the FDA EUA for laboratories certified under CLIA to perform moderate and/or high complexity testing. This test has not been FDA cleared or approved.  A negative test does not rule out the presence of PCR inhibitors in the specimen or target RNA in concentration below the limit of detection for the assay. The possibility of a false negative should be considered if the patient's recent exposure or clinical presentation suggests COVID-19.  [facility] are certified under the Clinical Laboratory Improvement Amendments of 1988 (CLIA-88) as qualified to perform moderate and/or high complexity laboratory testing.   Negative, Invalid  LABORATORY       Asymptomatic SARS-CoV-2 (COVID-19)-PCR (06/12/2021 3:33 AM CDT)   Specimen   Respiratory     Asymptomatic SARS-CoV-2 (COVID-19)-PCR (06/12/2021 3:33 AM CDT)   Performing Organization Address City/State/ZIP Code Phone Number   [facility] LABORATORY   [address] [place] [address]         LABORATORY   [facility] [address], [place]        Back to top of Results       XR Wrist Left 3 or More VWS (06/12/2021 2:42 AM CDT)  XR Wrist Left 3 or More VWS (06/12/2021 2:42 AM CDT)   Specimen         XR Wrist Left 3 or More VWS (06/12/2021 2:42 AM CDT)   Impressions Performed At   No acute fracture or dislocation. Sideplate crosses an old distal radius fracture. 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   HE IMG RESULT       XR Wrist Left 3 or More VWS (06/12/2021 2:42 AM CDT)   Narrative Performed At   EXAM: XR WRIST LEFT 3 OR MORE VWS    LOCATION: [facility]    DATE/TIME: 6/12/2021 2:42 AM        INDICATION: FOOSH    COMPARISON:  None.       HE IMG RESULT       XR Wrist Left 3 or More VWS (06/12/2021 2:42 AM CDT)   Procedure Note   Interface, Rad Results In - 06/12/2021 2:59 AM CDT    Formatting of this note might be different from the original.  EXAM: XR WRIST LEFT 3 OR MORE VWS  LOCATION: [facility]  DATE/TIME: 6/12/2021 2:42 AM    INDICATION: FOOSH  COMPARISON: None.    IMPRESSION:   No acute fracture or dislocation. Sideplate crosses an old distal radius fracture. 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       XR Wrist Left 3 or More VWS (06/12/2021 2:42 AM CDT)   Performing Organization Address City/State/ZIP Code Phone Number   HE IMG RESULT            Back to top of Results       XR Sacrum and Coccyx 2 or More VWS (06/12/2021 2:41 AM CDT)  XR Sacrum and Coccyx 2 or More VWS (06/12/2021 2:41 AM CDT)   Specimen         XR Sacrum and Coccyx 2 or More VWS (06/12/2021 2:41 AM CDT)   Impressions Performed At   There are fractures of the left superior and inferior pubic rami. No sacral fracture. 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     HE IMG RESULT       XR Sacrum and Coccyx 2 or More VWS (06/12/2021 2:41 AM CDT)   Narrative Performed At   EXAM: XR SACRUM AND COCCYX 2 OR MORE VWS    LOCATION: [facility]    DATE/TIME: 6/12/2021 2:41 AM        INDICATION: Fall, pain    COMPARISON: None.       HE IMG RESULT       XR Sacrum and Coccyx 2 or More VWS (06/12/2021 2:41 AM CDT)   Procedure Note   Interface, Rad Results In - 06/12/2021 2:59 AM CDT    Formatting of this note might be different from the original.  EXAM: XR SACRUM AND COCCYX 2 OR MORE VWS  LOCATION: [facility]  DATE/TIME: 6/12/2021 2:41 AM    INDICATION: Fall, pain  COMPARISON: None.    IMPRESSION:   There are fractures of the left superior and inferior pubic rami. No sacral fracture. 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        XR Sacrum and Coccyx 2 or More VWS (06/12/2021 2:41 AM CDT)   Performing  Organization Address City/State/ZIP Code Phone Number   HE IMG RESULT            Back to top of Results       CT Lumbar Spine Without Contrast (06/12/2021 2:01 AM CDT)  CT Lumbar Spine Without Contrast (06/12/2021 2:01 AM CDT)   Specimen         CT Lumbar Spine Without Contrast (06/12/2021 2:01 AM CDT)   Impressions Performed At   1.  No fracture or posttraumatic subluxation.    2.  Degenerative changes, as described.   HE IMG RESULT       CT Lumbar Spine Without Contrast (06/12/2021 2:01 AM CDT)   Narrative Performed At   EXAM: CT LUMBAR SPINE WO CONTRAST    LOCATION: [facility]    DATE/TIME: 6/12/2021 2:01 AM        INDICATION: Back injury, acute on chronic sciatica    COMPARISON: None.    TECHNIQUE: Routine CT Lumbar Spine without IV contrast. Multiplanar reformats. Dose reduction techniques were used.         FINDINGS:    VERTEBRA: Normal vertebral body heights. Slight left lumbar curve. 4 mm anterior subluxation L4 on L5. No fracture or posttraumatic subluxation.         CANAL/FORAMINA: Moderate degenerative changes with mild canal narrowing at L3-L4 and moderate to marked at L4-L5. Marked right and moderate left foraminal narrowing at L4-L5, mild to moderate left at L3-L4.        PARASPINAL: No extraspinal abnormality.

## 2021-06-12 NOTE — H&P
Admitted: 06/12/2021    HISTORY OF PRESENT ILLNESS:  This is an 80-year-old female with history of anxiety, depression, hypertension, hyperlipidemia, hearing loss, osteoporosis, was transferred here from [facility] because of fall and left pelvic fracture.    According to the patient, she was out with friends and had 3 drinks of vodka was coming back home and fell, which she thinks is a mechanical fall and landed on her left side, hitting her left wrist and left hip.  She did not hit her head, has not lost consciousness.  She did not feel dizzy or lightheaded before she fell down.  She went to the ER and found to have left pelvic fracture.  As the pain was uncontrolled and unable to get up so she was transferred to [facility] for further management.    At this time, she denies any chest pain, shortness of breath, orthopnea, PND, palpitation.  No headache, dizziness, lightheadedness.  No fever, chills or cough.  No dysuria, hematuria, constipation, diarrhea at this time.  Rest of the review of system is negative at this time.    ASSESSMENT AND PLAN:    1.  Mechanical fall with left pelvic fracture:  This is an 80-year-old female, not on any anticoagulation, presented with mechanical fall,  has left pelvic fracture with pain.  It is now much better controlled at this time.  We will admit her for observation and have PT, OT evaluate the patient.  Weightbearing as tolerated.  Have orthopedic evaluate the patient.  Keep her on Tylenol, oxycodone for pain control.  /care coordinator consult for safe disposition.  2.  HYPERTENSION/HYPERLIPIDEMIA:  She is on triamterene/hydrochlorothiazide 37.5/25 mg daily.  We will continue with that.  Keep her on Lipitor 10 mg daily.  History of anxiety, depression, well controlled with Paxil 20 mg daily.  We will continue with that.   3.  HISTORY OF OSTEOPOROSIS:  On Fosamax.  We will hold it while she is in the hospital.  DVT prophylaxis with SCDs.    CODE  STATUS:  DNR/DNI.    The case discussed with nursing staff taking care of the patient.         Thomas Reilly MD        D: 2021   T: 2021   MT: lesli    Name:     SRIDEVI JOHNSON EV  MRN:      [id number]        Account:     [id number]   :      1940           Admitted:    2021       Document: [id number]

## 2021-06-13 ENCOUNTER — APPOINTMENT (OUTPATIENT)
Dept: PHYSICAL THERAPY | Facility: CLINIC | Age: 81
DRG: 536 | End: 2021-06-13
Attending: INTERNAL MEDICINE
Payer: MEDICARE

## 2021-06-13 LAB
ANION GAP SERPL CALCULATED.3IONS-SCNC: 6 MMOL/L (ref 3–14)
BUN SERPL-MCNC: 13 MG/DL (ref 7–30)
CALCIUM SERPL-MCNC: 8.5 MG/DL (ref 8.5–10.1)
CHLORIDE SERPL-SCNC: 95 MMOL/L (ref 94–109)
CO2 SERPL-SCNC: 26 MMOL/L (ref 20–32)
CREAT SERPL-MCNC: 0.72 MG/DL (ref 0.52–1.04)
ERYTHROCYTE [DISTWIDTH] IN BLOOD BY AUTOMATED COUNT: 15.9 % (ref 10–15)
GFR SERPL CREATININE-BSD FRML MDRD: 79 ML/MIN/{1.73_M2}
GLUCOSE SERPL-MCNC: 109 MG/DL (ref 70–99)
HCT VFR BLD AUTO: 31.9 % (ref 35–47)
HGB BLD-MCNC: 10.2 G/DL (ref 11.7–15.7)
MCH RBC QN AUTO: 25 PG (ref 26.5–33)
MCHC RBC AUTO-ENTMCNC: 32 G/DL (ref 31.5–36.5)
MCV RBC AUTO: 78 FL (ref 78–100)
PLATELET # BLD AUTO: 199 10E9/L (ref 150–450)
POTASSIUM SERPL-SCNC: 3.3 MMOL/L (ref 3.4–5.3)
POTASSIUM SERPL-SCNC: 3.3 MMOL/L (ref 3.4–5.3)
POTASSIUM SERPL-SCNC: 3.4 MMOL/L (ref 3.4–5.3)
POTASSIUM SERPL-SCNC: 3.8 MMOL/L (ref 3.4–5.3)
RBC # BLD AUTO: 4.08 10E12/L (ref 3.8–5.2)
SODIUM SERPL-SCNC: 127 MMOL/L (ref 133–144)
WBC # BLD AUTO: 6.2 10E9/L (ref 4–11)

## 2021-06-13 PROCEDURE — 999N000111 HC STATISTIC OT IP EVAL DEFER: Performed by: OCCUPATIONAL THERAPIST

## 2021-06-13 PROCEDURE — 85027 COMPLETE CBC AUTOMATED: CPT | Performed by: INTERNAL MEDICINE

## 2021-06-13 PROCEDURE — 99232 SBSQ HOSP IP/OBS MODERATE 35: CPT | Performed by: STUDENT IN AN ORGANIZED HEALTH CARE EDUCATION/TRAINING PROGRAM

## 2021-06-13 PROCEDURE — G0378 HOSPITAL OBSERVATION PER HR: HCPCS

## 2021-06-13 PROCEDURE — 250N000013 HC RX MED GY IP 250 OP 250 PS 637: Performed by: INTERNAL MEDICINE

## 2021-06-13 PROCEDURE — 120N000001 HC R&B MED SURG/OB

## 2021-06-13 PROCEDURE — 36415 COLL VENOUS BLD VENIPUNCTURE: CPT | Performed by: INTERNAL MEDICINE

## 2021-06-13 PROCEDURE — 97530 THERAPEUTIC ACTIVITIES: CPT | Mod: GP

## 2021-06-13 PROCEDURE — 84132 ASSAY OF SERUM POTASSIUM: CPT | Performed by: STUDENT IN AN ORGANIZED HEALTH CARE EDUCATION/TRAINING PROGRAM

## 2021-06-13 PROCEDURE — 250N000013 HC RX MED GY IP 250 OP 250 PS 637: Performed by: STUDENT IN AN ORGANIZED HEALTH CARE EDUCATION/TRAINING PROGRAM

## 2021-06-13 PROCEDURE — 80048 BASIC METABOLIC PNL TOTAL CA: CPT | Performed by: INTERNAL MEDICINE

## 2021-06-13 PROCEDURE — 36415 COLL VENOUS BLD VENIPUNCTURE: CPT | Performed by: STUDENT IN AN ORGANIZED HEALTH CARE EDUCATION/TRAINING PROGRAM

## 2021-06-13 RX ORDER — POTASSIUM CHLORIDE 1500 MG/1
40 TABLET, EXTENDED RELEASE ORAL ONCE
Status: COMPLETED | OUTPATIENT
Start: 2021-06-13 | End: 2021-06-13

## 2021-06-13 RX ADMIN — OXYCODONE HYDROCHLORIDE 5 MG: 5 TABLET ORAL at 18:46

## 2021-06-13 RX ADMIN — OXYCODONE HYDROCHLORIDE 5 MG: 5 TABLET ORAL at 06:06

## 2021-06-13 RX ADMIN — ATORVASTATIN CALCIUM 10 MG: 10 TABLET, FILM COATED ORAL at 21:09

## 2021-06-13 RX ADMIN — POTASSIUM CHLORIDE 40 MEQ: 1500 TABLET, EXTENDED RELEASE ORAL at 08:50

## 2021-06-13 RX ADMIN — PAROXETINE HYDROCHLORIDE 20 MG: 20 TABLET, FILM COATED ORAL at 08:47

## 2021-06-13 RX ADMIN — POTASSIUM CHLORIDE 40 MEQ: 1500 TABLET, EXTENDED RELEASE ORAL at 14:01

## 2021-06-13 RX ADMIN — ACETAMINOPHEN 650 MG: 325 TABLET, FILM COATED ORAL at 21:27

## 2021-06-13 ASSESSMENT — ACTIVITIES OF DAILY LIVING (ADL)
DOING_ERRANDS_INDEPENDENTLY_DIFFICULTY: YES
WALKING_OR_CLIMBING_STAIRS: AMBULATION DIFFICULTY, ASSISTANCE 1 PERSON
DIFFICULTY_COMMUNICATING: NO
WEAR_GLASSES_OR_BLIND: YES
TOILETING_ISSUES: NO
DIFFICULTY_EATING/SWALLOWING: NO
WERE_AUXILIARY_AIDS_OFFERED?: NO
WALKING_OR_CLIMBING_STAIRS_DIFFICULTY: YES
ADLS_ACUITY_SCORE: 19
HEARING_DIFFICULTY_OR_DEAF: YES
DESCRIBE_HEARING_LOSS: BILATERAL HEARING LOSS
PATIENT'S_PREFERRED_MEANS_OF_COMMUNICATION: ENGLISH SPEAKER WITH HEARING LOSS, NO SPEECH PROBLEMS.
DRESSING/BATHING_DIFFICULTY: NO
ADLS_ACUITY_SCORE: 19
CONCENTRATING,_REMEMBERING_OR_MAKING_DECISIONS_DIFFICULTY: YES
USE_OF_HEARING_ASSISTIVE_DEVICES: RIGHT HEARING AID;LEFT HEARING AID

## 2021-06-13 ASSESSMENT — MIFFLIN-ST. JEOR: SCORE: 1324.75

## 2021-06-13 NOTE — PROGRESS NOTES
[facility]    Medicine Progress Note - Hospitalist Service       Date of Admission:  6/12/2021  Date of Service: 06/13/2021    Assessment & Plan          1.  Mechanical fall with left pelvic fracture:  This is an 80-year-old female, not on any anticoagulation, presented with mechanical fall,  has left pelvic fracture with pain.  It is now much better controlled at this time.    Plan:   - PT, OT evaluate the patient.    - Weightbearing as tolerated.    - Have orthopedic evaluate the patient -> Indicated for nonoperative management  - No sacral fractures on LS Spine CT  - Will obtain AP/Inlet/Outlet X-Rays for completion  - WBAT bilateral UEs and LEs  - ROM as tolerated bilateral UEs and LEs  - Keep her on Tylenol, oxycodone for pain control.    - /care coordinator consult for safe disposition.    2.  HYPERTENSION/HYPERLIPIDEMIA:  She is on triamterene/hydrochlorothiazide 37.5/25 mg daily.   Plan:  - Keep her on Lipitor 10 mg daily.      3. History of anxiety, depression, well controlled with Paxil 20 mg daily.  We will continue with that.     4. HISTORY OF OSTEOPOROSIS:  On Fosamax.  We will hold it while she is in the hospital.  DVT prophylaxis with SCDs.         Diet: Regular Diet Adult    DVT Prophylaxis: Pneumatic Compression Devices  [place]on Catheter: not present  Code Status: No CPR- Do NOT Intubate           Disposition Plan   Expected discharge: Tomorrow, recommended to transitional care unit once safe disposition plan/ TCU bed available.  Entered: Alvaro Mayers MD 06/13/2021, 2:47 PM       The patient's care was discussed with the Bedside Nurse and Patient.    Alvaro Mayers MD  Hospitalist Service  [facility]  Contact information available via [facility] Paging/Directory    ______________________________________________________________________    Interval History     Pain controlled for most part  No CP/SOB  No fevers or chills  No nausea/vomiting or  abdominal pain  No new complaints     Data reviewed today: I reviewed all medications, new labs and imaging results over the last 24 hours. I personally reviewed no images or EKG's today.    Physical Exam   Vital Signs: Temp: 99.3  F (37.4  C) Temp src: Oral BP: 112/69 Pulse: 80   Resp: 16 SpO2: 95 % O2 Device: None (Room air) Oxygen Delivery: 1 LPM  Weight: 185 lbs 13.56 oz    Constitutional: no apparent distress  Respiratory: Clear to auscultation bilaterally, no crackles or wheezing.  Cardiovascular: Regular rate and rhythm, normal S1 and S2, and no murmur noted.  GI: Soft, non-distended, non-tender, normal bowel sounds.  Lymph/Hematologic: No anterior cervical or supraclavicular adenopathy.  Skin: No rashes, no cyanosis, no edema.  Musculoskeletal: No joint swelling, erythema or tenderness. Has pain to left hip, no deformity at left hip or thigh, both leg same length   Neurologic: normal strength and sensation.  Psychiatric: Alert, oriented to person, place and time, no obvious anxiety or depression.    Data   Recent Labs   Lab 06/13/21  1245 06/13/21  0848 06/13/21  0714   WBC  --   --  6.2   HGB  --   --  10.2*   MCV  --   --  78   PLT  --   --  199   NA  --   --  127*   POTASSIUM 3.3* 3.4 3.3*   CHLORIDE  --   --  95   CO2  --   --  26   BUN  --   --  13   CR  --   --  0.72   ANIONGAP  --   --  6   HEBERT  --   --  8.5   GLC  --   --  109*     Recent Results (from the past 24 hour(s))   XR Pelvis 1/2 Views    Narrative    EXAM: XR PELVIS 1/2 VW  LOCATION: [facility]  DATE/TIME: 6/12/2021 8:04 PM    INDICATION: Fracture.  COMPARISON: None.      Impression    IMPRESSION: Acute mildly displaced left superior and inferior rami fractures. Probable minimally displaced left sacral fracture. Irregularity of the right parasymphyseal region is probably related to a more remote, prior injury as there is no discrete   fracture line seen on this exam.    Bones are demineralized.     Medications        atorvastatin  10 mg Oral QPM     PARoxetine  20 mg Oral Daily

## 2021-06-13 NOTE — PROVIDER NOTIFICATION
MD Notification    Notified Person: MD    Notified Person Name: MayersAlvaro mendez MD    Notification Date/Time: 0811 6/13/2021    Notification Interaction: paged    Purpose of Notification: K+ 3.3, need orders    Orders Received: pending    Comments:

## 2021-06-13 NOTE — PLAN OF CARE
OT: Orders received. Chart reviewed and discussed with care team.  OT not indicated due to pt having increased pain and decreased activity tolerance. Only able to tolerate 1 therapy at this time. PT already in to address functional mobility with plan for TCU at discharge. Will defer OT to next level of care.  Defer discharge recommendations to medical team.  Will complete orders.

## 2021-06-13 NOTE — UTILIZATION REVIEW
[facility] Utilization Review  Admission Status; Secondary Review Determination     Admission Date: 6/12/2021  7:36 AM      Under the authority of the Utilization Management Committee, the utilization review process indicated a secondary review on the above patient.  The review outcome is based on review of the medical records, discussions with staff, and applying clinical experience noted on the date of the review.        (X)      Inpatient Status Appropriate - This patient's medical care is consistent with medical management for inpatient care and reasonable inpatient medical practice.          RATIONALE FOR DETERMINATION   Alivia Brown is a 80 year old female with past medical history of anxiety and depression, hypertension, hyperlipidemia, osteoporosis, who presented to the emergency room after a mechanical fall with left hip and wrist injuries.  She is noted to have pelvic fractures and is registered to observation for pain control, supportive cares, orthopedic consultation, PT/OT and immobilization.  She continues to have significant pain and orthopedic surgery recommended conservative management in light of significant comorbidities and advanced age.  She is unable to tolerate PT at this time due to severe pain.  Plan is to continue pain management, supportive cares, PT/OT and possible disposition to TCU.  In light of failed observation cares, need for ongoing management with anticipated length of stay more than 2 midnights, criteria for inpatient admission is met.  Recommendation is communicated to the primary team, Dr Mayers.        The severity of illness, intensity of service provided, expected length of stay and risk for adverse outcome make the care complex, high risk and appropriate for hospital admission.The patient requires hospital based medical care which is anticipated to require a stay of 2 or more midnights;  therefore the patient is appropriately admitted to the hospital as  inpatient.         The information on this document is developed by the utilization review team in order for the business office to ensure compliance.  This only denotes the appropriateness of proper admission status and does not reflect the quality of care rendered.              Sincerely,       Michelle Tamez MD, MS  Physician Advisor  Utilization Review-[place]    Phone: [phone]

## 2021-06-13 NOTE — PLAN OF CARE
Reason for admission: Admitted on 06/12/2021 was transferred here from [facility] because of fall and left pelvic fracture.   Date/Time: June 12, 2021 10:06 PM   Cognitive/Mentation: A&Ox4  Neuros/CMS: Intact  VS: B/P: 127/69, T: 98.1, P: 76, R: 16     Cardiovascular/Telemetry:NA  Respiratory:LS clear bilaterally    GI:BS+, Flatus+, no BM this shift  : Purewick in place  Pain: Managed with PRN  5 mg of Oxycodone, and 650 mg Tylenol and ice packs     Drains: PIV -SL  Skin: Scattered bruising, blanchable left elbow  Activity:Up with A-1/2 gb and walker. WBAT   Diet:Reg  Discharge: Pending pt progress    History: Anxiety, depression, hypertension, hyperlipidemia, hearing loss, and osteoporosis    End of shift summary: 1 L NC, O2 sat trends down when sleeping.  L wrist splint in place. X-Ray completed this shift.

## 2021-06-13 NOTE — PLAN OF CARE
Alert and oriented, pure wick in place, oxycodone for pain management, VSS, 1 Liter of oxygen at bed time.

## 2021-06-14 LAB — GLUCOSE BLDC GLUCOMTR-MCNC: 118 MG/DL (ref 70–99)

## 2021-06-14 PROCEDURE — 999N001017 HC STATISTIC GLUCOSE BY METER IP

## 2021-06-14 PROCEDURE — 250N000013 HC RX MED GY IP 250 OP 250 PS 637: Performed by: INTERNAL MEDICINE

## 2021-06-14 PROCEDURE — 120N000001 HC R&B MED SURG/OB

## 2021-06-14 PROCEDURE — 99232 SBSQ HOSP IP/OBS MODERATE 35: CPT | Performed by: STUDENT IN AN ORGANIZED HEALTH CARE EDUCATION/TRAINING PROGRAM

## 2021-06-14 RX ADMIN — PAROXETINE HYDROCHLORIDE 20 MG: 20 TABLET, FILM COATED ORAL at 08:15

## 2021-06-14 RX ADMIN — OXYCODONE HYDROCHLORIDE 5 MG: 5 TABLET ORAL at 17:41

## 2021-06-14 RX ADMIN — ACETAMINOPHEN 650 MG: 325 TABLET, FILM COATED ORAL at 17:40

## 2021-06-14 RX ADMIN — ACETAMINOPHEN 650 MG: 325 TABLET, FILM COATED ORAL at 06:44

## 2021-06-14 RX ADMIN — OXYCODONE HYDROCHLORIDE 5 MG: 5 TABLET ORAL at 06:42

## 2021-06-14 RX ADMIN — ACETAMINOPHEN 650 MG: 325 TABLET, FILM COATED ORAL at 01:41

## 2021-06-14 RX ADMIN — ATORVASTATIN CALCIUM 10 MG: 10 TABLET, FILM COATED ORAL at 20:27

## 2021-06-14 ASSESSMENT — ACTIVITIES OF DAILY LIVING (ADL)
ADLS_ACUITY_SCORE: 19
ADLS_ACUITY_SCORE: 18
ADLS_ACUITY_SCORE: 19
ADLS_ACUITY_SCORE: 18

## 2021-06-14 ASSESSMENT — MIFFLIN-ST. JEOR: SCORE: 1325.75

## 2021-06-14 NOTE — PLAN OF CARE
A&Ox4.  VSS.  [place].  RA.  Reports of L hip pain, decreased with PRN oxy and ice packs.  Skin intact, some scattered bruising.  One BM this AM in bedpan.  Purewick utilized, good UO.  Tolerating regular diet, good appetite. CMS intact.  WBAT.  Was able to get to edge of bed with PT, reported dizziness, was hypotensive and activity stopped.  BP normalized with rest.  Potassium replaced: 3.3, 3.4, 3.3, 3.8.  R PIV SL.

## 2021-06-14 NOTE — PROGRESS NOTES
[facility]    Medicine Progress Note - Hospitalist Service       Date of Admission:  6/12/2021  Date of Service: 06/14/2021    Assessment & Plan          1.  Mechanical fall with left pelvic fracture:  This is an 80-year-old female, not on any anticoagulation, presented with mechanical fall,  has left pelvic fracture with pain.  It is now much better controlled at this time.    Plan:   - PT, OT -> TCU  - Have orthopedic evaluate the patient -> Indicated for nonoperative management  - No sacral fractures on LS Spine CT  - WBAT bilateral UEs and LEs  - ROM as tolerated bilateral UEs and LEs  - Keep her on Tylenol, oxycodone for pain control.    - /care coordinator consult for safe disposition.    2.  HYPERTENSION/HYPERLIPIDEMIA:  She is on triamterene/hydrochlorothiazide 37.5/25 mg daily.   Plan:  - Keep her on Lipitor 10 mg daily.      3. History of anxiety, depression, well controlled with Paxil 20 mg daily -> will continue with that.     4. HISTORY OF OSTEOPOROSIS:  On Fosamax. Hold it while she is in the hospital.          Diet: Regular Diet Adult    DVT Prophylaxis: Pneumatic Compression Devices  Williamson Catheter: not present  Code Status: No CPR- Do NOT Intubate           Disposition Plan   Expected discharge: Tomorrow, recommended to transitional care unit once safe disposition plan/ TCU bed available.  Entered: Alvaro Mayers MD 06/14/2021, 1:07 PM       The patient's care was discussed with the Bedside Nurse and Patient.    Alvaro Mayers MD  Hospitalist Service  [facility]  Contact information available via [facility] Paging/Directory    ______________________________________________________________________    Interval History     No acute events overnight  Pain controlled  No CP/SOB  No fevers or chills  No nausea/vomiting or abdominal pain  No new complaints     Data reviewed today: I reviewed all medications, new labs and imaging results over the last  24 hours. I personally reviewed no images or EKG's today.    Physical Exam   Vital Signs: Temp: 98.3  F (36.8  C) Temp src: Oral BP: 128/75 Pulse: 83   Resp: 16 SpO2: 92 % O2 Device: None (Room air)    Weight: 186 lbs 1.09 oz    Constitutional: no apparent distress  Respiratory: Clear to auscultation bilaterally, no crackles or wheezing.  Cardiovascular: Regular rate and rhythm, normal S1 and S2, and no murmur noted.  GI: Soft, non-distended, non-tender, normal bowel sounds.  Lymph/Hematologic: No anterior cervical or supraclavicular adenopathy.  Skin: No rashes, no cyanosis, no edema.  Musculoskeletal: No joint swelling, erythema or tenderness. Has pain to left hip, no deformity at left hip or thigh, both leg same length   Neurologic: normal strength and sensation.  Psychiatric: Alert, oriented to person, place and time, no obvious anxiety or depression.    Data   Recent Labs   Lab 06/13/21  1729 06/13/21  1245 06/13/21  0848 06/13/21  0714   WBC  --   --   --  6.2   HGB  --   --   --  10.2*   MCV  --   --   --  78   PLT  --   --   --  199   NA  --   --   --  127*   POTASSIUM 3.8 3.3* 3.4 3.3*   CHLORIDE  --   --   --  95   CO2  --   --   --  26   BUN  --   --   --  13   CR  --   --   --  0.72   ANIONGAP  --   --   --  6   HEBERT  --   --   --  8.5   GLC  --   --   --  109*     No results found for this or any previous visit (from the past 24 hour(s)).  Medications       atorvastatin  10 mg Oral QPM     PARoxetine  20 mg Oral Daily

## 2021-06-14 NOTE — PLAN OF CARE
Patient vital signs are at baseline: Yes  Patient able to ambulate as they were prior to admission or with assist devices provided by therapies during their stay:  Yes  Patient MUST void prior to discharge:  Yes  Patient able to tolerate oral intake:  Yes  Pain has adequate pain control using Oral analgesics:  Yes    AOx4, VSS on RA, [place], forgetful, c/o left hip pain managed by icepacks, tylenol and oxycodone, PW patent and draining w/ goood output, CMS intact, WBAT, R PIV SL, d/c to TCU today pending safe dispotion and bed availability

## 2021-06-15 ENCOUNTER — APPOINTMENT (OUTPATIENT)
Dept: PHYSICAL THERAPY | Facility: CLINIC | Age: 81
DRG: 536 | End: 2021-06-15
Attending: INTERNAL MEDICINE
Payer: MEDICARE

## 2021-06-15 LAB — POTASSIUM SERPL-SCNC: 4.3 MMOL/L (ref 3.4–5.3)

## 2021-06-15 PROCEDURE — 36415 COLL VENOUS BLD VENIPUNCTURE: CPT | Performed by: STUDENT IN AN ORGANIZED HEALTH CARE EDUCATION/TRAINING PROGRAM

## 2021-06-15 PROCEDURE — 120N000001 HC R&B MED SURG/OB

## 2021-06-15 PROCEDURE — 97530 THERAPEUTIC ACTIVITIES: CPT | Mod: GP | Performed by: PHYSICAL THERAPY ASSISTANT

## 2021-06-15 PROCEDURE — 99232 SBSQ HOSP IP/OBS MODERATE 35: CPT | Performed by: STUDENT IN AN ORGANIZED HEALTH CARE EDUCATION/TRAINING PROGRAM

## 2021-06-15 PROCEDURE — 250N000013 HC RX MED GY IP 250 OP 250 PS 637: Performed by: INTERNAL MEDICINE

## 2021-06-15 PROCEDURE — 84132 ASSAY OF SERUM POTASSIUM: CPT | Performed by: STUDENT IN AN ORGANIZED HEALTH CARE EDUCATION/TRAINING PROGRAM

## 2021-06-15 RX ADMIN — ATORVASTATIN CALCIUM 10 MG: 10 TABLET, FILM COATED ORAL at 19:54

## 2021-06-15 RX ADMIN — PAROXETINE HYDROCHLORIDE 20 MG: 20 TABLET, FILM COATED ORAL at 09:00

## 2021-06-15 RX ADMIN — OXYCODONE HYDROCHLORIDE 5 MG: 5 TABLET ORAL at 15:27

## 2021-06-15 RX ADMIN — ACETAMINOPHEN 650 MG: 325 TABLET, FILM COATED ORAL at 09:00

## 2021-06-15 RX ADMIN — ACETAMINOPHEN 650 MG: 325 TABLET, FILM COATED ORAL at 19:53

## 2021-06-15 ASSESSMENT — ACTIVITIES OF DAILY LIVING (ADL)
ADLS_ACUITY_SCORE: 18
ADLS_ACUITY_SCORE: 16

## 2021-06-15 NOTE — PROGRESS NOTES
[id number] A&OX4, [place], A1 GB/W, regular diet, denies pain this shift, discharge pending for 6/15/2021 if TCU facility is found.  Has left-wrist sprain with mild bruising.

## 2021-06-15 NOTE — PLAN OF CARE
A&Ox4. Up w/Ax2 GB &W. reg diet. VSS on RA.  C/o 6/10 hip  pain, given oxy prn, ice and diversion techniques used intermittently . PIV SL, patient to discharge to TCU, possibly in AM if one is found.

## 2021-06-15 NOTE — PROGRESS NOTES
[facility]    Medicine Progress Note - Hospitalist Service       Date of Admission:  6/12/2021  Date of Service: 06/15/2021    Assessment & Plan          1.  Mechanical fall with left pelvic fracture:  This is an 80-year-old female, not on any anticoagulation, presented with mechanical fall,  has left pelvic fracture with pain.  It is now much better controlled at this time.    Plan:   - PT, OT -> TCU  - Have orthopedic evaluate the patient -> Indicated for nonoperative management  - No sacral fractures on LS Spine CT  - WBAT bilateral UEs and LEs  - ROM as tolerated bilateral UEs and LEs  - Keep her on Tylenol, oxycodone for pain control.    - /care coordinator consult for safe disposition.    2.  HYPERTENSION/HYPERLIPIDEMIA:  She is on triamterene/hydrochlorothiazide 37.5/25 mg daily.   Plan:  - Lipitor 10 mg daily.      3. History of anxiety, depression, well controlled with Paxil 20 mg daily -> will continue with that.     4. HISTORY OF OSTEOPOROSIS:  On Fosamax. Hold it while she is in the hospital.          Diet: Regular Diet Adult    DVT Prophylaxis: Pneumatic Compression Devices  Williamson Catheter: not present  Code Status: No CPR- Do NOT Intubate           Disposition Plan   Expected discharge: Tomorrow, recommended to transitional care unit once safe disposition plan/ TCU bed available.  Entered: Alvaro Mayers MD 06/15/2021, 5:16 PM       The patient's care was discussed with the Bedside Nurse and Patient.    Alvaro Mayers MD  Hospitalist Service  [facility]  Contact information available via [facility] Paging/Directory    ______________________________________________________________________    Interval History     No acute events overnight  Pain stable  No CP/SOB  No fevers or chills  No nausea/vomiting or abdominal pain  No new complaints     Data reviewed today: I reviewed all medications, new labs and imaging results over the last 24 hours. I  personally reviewed no images or EKG's today.    Physical Exam   Vital Signs: Temp: 98.7  F (37.1  C) Temp src: Oral BP: (!) 154/86 Pulse: 81   Resp: 18 SpO2: 97 % O2 Device: None (Room air)    Weight: 186 lbs 1.09 oz    Constitutional: no apparent distress  Respiratory: Clear to auscultation bilaterally, no crackles or wheezing.  Cardiovascular: Regular rate and rhythm, normal S1 and S2, and no murmur noted.  GI: Soft, non-distended, non-tender, normal bowel sounds.  Lymph/Hematologic: No anterior cervical or supraclavicular adenopathy.  Skin: No rashes, no cyanosis, no edema.  Musculoskeletal: No joint swelling, erythema or tenderness. Has pain to left hip  Neurologic: normal strength and sensation.  Psychiatric: Alert, oriented to person, place and time, no obvious anxiety or depression.    Data   Recent Labs   Lab 06/15/21  0750 06/13/21  1729 06/13/21  1245 06/13/21  0714 06/13/21  0714   WBC  --   --   --   --  6.2   HGB  --   --   --   --  10.2*   MCV  --   --   --   --  78   PLT  --   --   --   --  199   NA  --   --   --   --  127*   POTASSIUM 4.3 3.8 3.3*   < > 3.3*   CHLORIDE  --   --   --   --  95   CO2  --   --   --   --  26   BUN  --   --   --   --  13   CR  --   --   --   --  0.72   ANIONGAP  --   --   --   --  6   HEBERT  --   --   --   --  8.5   GLC  --   --   --   --  109*    < > = values in this interval not displayed.     No results found for this or any previous visit (from the past 24 hour(s)).  Medications       atorvastatin  10 mg Oral QPM     PARoxetine  20 mg Oral Daily

## 2021-06-15 NOTE — PLAN OF CARE
Patient alert and oriented, forgetful at times, pelvis fracture, oxycodone and tylenol for pain, ice on wrist and hip, regular diet, will discharge pending tCU. 1000cc urine output via external catheter.

## 2021-06-15 NOTE — PROGRESS NOTES
Care Management Follow Up    Length of Stay (days): 2    Expected Discharge Date:       Concerns to be Addressed:       Patient plan of care discussed at interdisciplinary rounds: Yes    Anticipated Discharge Disposition:       Anticipated Discharge Services:    Anticipated Discharge DME:      Patient/family educated on Medicare website which has current facility and service quality ratings:    Education Provided on the Discharge Plan:    Patient/Family in Agreement with the Plan:      Referrals Placed by CM/SW:    Private pay costs discussed: Not applicable    Additional Information:  SW received voicemail that pt can accept pt, but will need a new Covid test within 48 hours of admission.  SW called and left voicemail for daughter that pt is accepted at [facility], and accepted at [facility] depending on discharge date.    ADDENDUM: In anticipation of discharge tomorrow 6/16, SW set up 1700 discharge tomorrow (earliest available) wheelchair ride to [facility].  SW will talk to daughter about other potential TCU options.          Isabel Interiano, ALEJANDROSW

## 2021-06-15 NOTE — CONSULTS
Care Management Initial Consult    General Information  Assessment completed with:  ,    Type of CM/SW Visit: Initial Assessment    Primary Care Provider verified and updated as needed: Yes   Readmission within the last 30 days:           Advance Care Planning:            Communication Assessment  Patient's communication style: spoken language (English or Bilingual)    Hearing Difficulty or Deaf: yes   Wear Glasses or Blind: yes    Cognitive  Cognitive/Neuro/Behavioral: WDL  Level of Consciousness: alert  Arousal Level: arouses to voice, arouses to touch/gentle shaking, opens eyes spontaneously  Orientation: oriented x 4  Mood/Behavior: calm, cooperative  Best Language: 0 - No aphasia  Speech: clear, spontaneous, logical    Living Environment:   People in home:       Current living Arrangements: condominium      Able to return to prior arrangements:         Family/Social Support:  Care provided by:    Provides care for:    Marital Status:   , Children          Description of Support System:           Current Resources:   Patient receiving home care services:       Community Resources:    Equipment currently used at home: none  Supplies currently used at home:      Employment/Financial:  Employment Status: retired        Financial Concerns:             Lifestyle & Psychosocial Needs:        Socioeconomic History     Marital status:      Spouse name: Not on file     Number of children: Not on file     Years of education: Not on file     Highest education level: Not on file     Tobacco Use     Smoking status: Never Smoker     Smokeless tobacco: Never Used   Substance and Sexual Activity     Alcohol use: Yes     Comment: 3 drinks of vodka per week      Drug use: Never     Sexual activity: Not Currently       Functional Status:  Prior to admission patient needed assistance: Lived independently with spouse.              Mental Health Status:  Mental Health Status: No Current Concerns       Chemical  Dependency Status:  Chemical Dependency Status: No Current Concerns             Values/Beliefs:  Spiritual, Cultural Beliefs, [employer] Practices, Values that affect care:                 Additional Information:  Referrals sent DOD    SHAHEED Rivera

## 2021-06-16 ENCOUNTER — AMBULATORY - HEALTHEAST (OUTPATIENT)
Dept: GERIATRICS | Facility: CLINIC | Age: 81
End: 2021-06-16

## 2021-06-16 VITALS
BODY MASS INDEX: 29.92 KG/M2 | HEART RATE: 84 BPM | SYSTOLIC BLOOD PRESSURE: 136 MMHG | HEIGHT: 66 IN | TEMPERATURE: 99 F | OXYGEN SATURATION: 96 % | RESPIRATION RATE: 14 BRPM | DIASTOLIC BLOOD PRESSURE: 78 MMHG | WEIGHT: 186.2 LBS

## 2021-06-16 LAB
LABORATORY COMMENT REPORT: NORMAL
POTASSIUM SERPL-SCNC: 4.2 MMOL/L (ref 3.4–5.3)
SARS-COV-2 RNA RESP QL NAA+PROBE: NEGATIVE
SPECIMEN SOURCE: NORMAL

## 2021-06-16 PROCEDURE — 87635 SARS-COV-2 COVID-19 AMP PRB: CPT | Performed by: STUDENT IN AN ORGANIZED HEALTH CARE EDUCATION/TRAINING PROGRAM

## 2021-06-16 PROCEDURE — 99239 HOSP IP/OBS DSCHRG MGMT >30: CPT | Performed by: STUDENT IN AN ORGANIZED HEALTH CARE EDUCATION/TRAINING PROGRAM

## 2021-06-16 PROCEDURE — 84132 ASSAY OF SERUM POTASSIUM: CPT | Performed by: STUDENT IN AN ORGANIZED HEALTH CARE EDUCATION/TRAINING PROGRAM

## 2021-06-16 PROCEDURE — 250N000013 HC RX MED GY IP 250 OP 250 PS 637: Performed by: INTERNAL MEDICINE

## 2021-06-16 PROCEDURE — 36415 COLL VENOUS BLD VENIPUNCTURE: CPT | Performed by: STUDENT IN AN ORGANIZED HEALTH CARE EDUCATION/TRAINING PROGRAM

## 2021-06-16 RX ORDER — ACETAMINOPHEN 325 MG/1
650 TABLET ORAL EVERY 4 HOURS PRN
DISCHARGE
Start: 2021-06-16

## 2021-06-16 RX ADMIN — ACETAMINOPHEN 650 MG: 325 TABLET, FILM COATED ORAL at 15:42

## 2021-06-16 RX ADMIN — PAROXETINE HYDROCHLORIDE 20 MG: 20 TABLET, FILM COATED ORAL at 08:55

## 2021-06-16 ASSESSMENT — MIFFLIN-ST. JEOR: SCORE: 1326.35

## 2021-06-16 ASSESSMENT — ACTIVITIES OF DAILY LIVING (ADL)
ADLS_ACUITY_SCORE: 22
ADLS_ACUITY_SCORE: 24

## 2021-06-16 NOTE — PLAN OF CARE
Pt A&Ox3 forgetful at times. VSS on RA. CMS intact. Bruising to left wrist. Voiding via purewich due to incontinence. Intermittently refusing turns overnight. Pt declined PRN tylenol for pain. Discharge to TCU at 1700. Will continue to monitor.

## 2021-06-16 NOTE — PLAN OF CARE
VSS on RA. A/O x4. Lung sounds clear. Bowel sounds active, passing flatus, BM. Incontinent. IV saline locked. Up with assist of 2, gaitbelt and walker. Pt denies pain. Repositioned every two hours. Tolerating regular diet. Plan to discharge today at 1700 with transportation to TCU.

## 2021-06-16 NOTE — PLAN OF CARE
Pt A/Ox 4. Pain covered with PO meds. Moving better with walker and one when up to the chair. CMS intact. Intermittent spasms in left groin. Plan to discharge to TCU tomorrow. Pure wick in place. BM this shift. Family at the bedside this am. Able to make needs known.

## 2021-06-16 NOTE — PROGRESS NOTES
Care Management Follow Up    Length of Stay (days): 3    Expected Discharge Date: 06/16/21  Expected Time of Departure: 5pm via w/c transport - pending covid swab results  Concerns to be Addressed: discharge planning     Patient plan of care discussed at interdisciplinary rounds: Yes    Anticipated Discharge Disposition: Transitional Care, Skilled Nursing Facilty  Disposition Comments: [facility].  Anticipated Discharge Services: Transportation Services  Anticipated Discharge DME: None    Patient/family educated on Medicare website which has current facility and service quality ratings: yes  Education Provided on the Discharge Plan:    Patient/Family in Agreement with the Plan: yes    Referrals Placed by CM/SW: Senior Linkage Line, Post Acute Facilities, Transportation  Private pay costs discussed: Not applicable    Additional Information:  Writer spoke with pts dtr who accepted bed at [facility]. She wants transport arranged and is accepting of the costs. [facility] w/c transport is scheduled for 5pm.     Writer spoke with [facility], who stated that a new covid swab is needed before pt can discharge to them. Provider paged for a priority swab. [facility] stated if we dont get the results before 5pm, pt can come later.     Orders were faxed.     PAS-RR    D: Per DHS regulation, SW completed and submitted PAS-RR to [place] [employer] Direct Connect via the [facility] LinkAge Line.  PAS-RR confirmation # is : [id number]    I: RELL spoke with pt and they are aware a PAS-RR has been submitted.  RELL reviewed with pt that they may be contacted for a follow up appointment within 10 days of hospital discharge if their SNF stay is < 30 days.  Contact information for [facility] Line was also provided.    A: pt verbalized understanding.    P: Further questions may be directed to [facility] Line at #[phone], option #4 for PAS-RR staff.    RAHEL Samuel

## 2021-06-16 NOTE — PLAN OF CARE
A/Ox4. Forgetful. VSS on RA. Denies pain when resting. Tylenol given. CMS intact. Up Ax2 GB/W. Purewick in place per pt request. Discharge to TCU pending.

## 2021-06-16 NOTE — PROGRESS NOTES
Care Management Discharge Note    Discharge Date: 06/16/21  Expected Time of Departure: 7pm via w/c transport - pending covid swab results    Discharge Disposition: Transitional Care, Skilled Nursing Facilty    Discharge Services: Transportation Services    Discharge DME: None    Discharge Transportation: agency    Private pay costs discussed: Not applicable    PAS Confirmation Code: [id number]  Patient/family educated on Medicare website which has current facility and service quality ratings: yes    Education Provided on the Discharge Plan:    Persons Notified of Discharge Plans: family, facility  Patient/Family in Agreement with the Plan: yes    Handoff Referral Completed: No    Additional Information:  Covid swab results still not obtained. Writer changed ride time to 7pm and notified dtr and facility.     If the unit has questions they are able to call the facility at [phone]. The facility has the unit number as well.    RAHEL Samuel

## 2021-06-16 NOTE — DISCHARGE SUMMARY
[facility]  Hospitalist Discharge Summary      Date of Admission:  6/12/2021  Date of Discharge:  6/16/2021  Discharging Provider: Alvaro Mayers MD      Discharge Diagnoses     Mechanical fall   Left pelvic fracture:    Follow-ups Needed After Discharge   Follow-up Appointments     Follow Up and recommended labs and tests      Follow up with Nursing home physician.  No follow up labs or test are   needed.             Unresulted Labs Ordered in the Past 30 Days of this Admission     No orders found from 5/13/2021 to 6/13/2021.        Discharge Disposition     Discharged to rehabilitation facility  Condition at discharge: Stable    Hospital Course            1.  Mechanical fall with left pelvic fracture:  This is an 80-year-old female, not on any anticoagulation, presented with mechanical fall,  has left pelvic fracture with pain.  It is now much better controlled at this time.    Plan:   - PT, OT -> TCU  - Have orthopedic evaluate the patient -> Indicated for nonoperative management  - No sacral fractures on LS Spine CT  - WBAT bilateral UEs and LEs  - /care coordinator consult for safe disposition.    2.  HYPERTENSION/HYPERLIPIDEMIA:  She is on triamterene/hydrochlorothiazide 37.5/25 mg daily.   Plan:  -= Resume Triamterene  - Lipitor 10 mg daily.      3. History of anxiety, depression, well controlled with Paxil 20 mg daily -> will continue with that.     4. HISTORY OF OSTEOPOROSIS:  On Fosamax. Hold it while she is in the hospital.         Consultations This Hospital Stay   CARE MANAGEMENT / SOCIAL WORK IP CONSULT  PHYSICAL THERAPY ADULT IP CONSULT  OCCUPATIONAL THERAPY ADULT IP CONSULT  ORTHOPEDIC SURGERY IP CONSULT  SOCIAL WORK IP CONSULT  PHYSICAL THERAPY ADULT IP CONSULT  OCCUPATIONAL THERAPY ADULT IP CONSULT    Code Status   No CPR- Do NOT Intubate    Time Spent on this Encounter   I, Alvaro Mayers MD, personally saw the patient today and spent greater than 30 minutes  discharging this patient.       Alvaro Mayers MD  [place] [address] [facility]  [address] [place] [address]  Phone: [phone]  ______________________________________________________________________    Physical Exam   Vital Signs: Temp: 98.9  F (37.2  C) Temp src: Oral BP: 126/74 Pulse: 80   Resp: 14 SpO2: 96 % O2 Device: None (Room air)    Weight: 186 lbs 3.2 oz    Primary Care Physician   Physician No Ref-Primary    Discharge Orders      General info for SNF    Length of Stay Estimate: Short Term Care: Estimated # of Days <30  Condition at Discharge: Improving  Level of care:skilled   Rehabilitation Potential: Good  Admission H&P remains valid and up-to-date: Yes  Recent Chemotherapy: N/A  Use Nursing Home Standing Orders: Yes     Mantoux instructions    Give two-step Mantoux (PPD) Per Facility Policy Yes     Intake and output    Every shift     Daily weights    Call Provider for weight gain of more than 2 pounds per day or 5 pounds per week.     Activity - Up with assistive device     Follow Up and recommended labs and tests    Follow up with Nursing home physician.  No follow up labs or test are needed.     Reason for your hospital stay    You had hip pain from a fracture.     Physical Therapy Adult Consult    Evaluate and treat as clinically indicated.    Reason:  Physical deconditioning     Occupational Therapy Adult Consult    Evaluate and treat as clinically indicated.    Reason:  Physical deconditioning     Fall precautions     Advance Diet as Tolerated    Follow this diet upon discharge: Orders Placed This Encounter      Regular Diet Adult       Significant Results and Procedures   Most Recent 3 CBC's:  Recent Labs   Lab Test 06/13/21  0714   WBC 6.2   HGB 10.2*   MCV 78        Most Recent 3 BMP's:  Recent Labs   Lab Test 06/16/21  0803 06/15/21  0750 06/13/21  1729 06/13/21  0714 06/13/21  0714   NA  --   --   --   --  127*   POTASSIUM 4.2 4.3 3.8   < > 3.3*    CHLORIDE  --   --   --   --  95   CO2  --   --   --   --  26   BUN  --   --   --   --  13   CR  --   --   --   --  0.72   ANIONGAP  --   --   --   --  6   HEBERT  --   --   --   --  8.5   GLC  --   --   --   --  109*    < > = values in this interval not displayed.     Most Recent 2 LFT's:No lab results found.  Most Recent 3 INR's:No lab results found.,   Results for orders placed or performed during the hospital encounter of 06/12/21   XR Pelvis 1/2 Views    Narrative    EXAM: XR PELVIS 1/2 VW  LOCATION: [facility]  DATE/TIME: 6/12/2021 8:04 PM    INDICATION: Fracture.  COMPARISON: None.      Impression    IMPRESSION: Acute mildly displaced left superior and inferior rami fractures. Probable minimally displaced left sacral fracture. Irregularity of the right parasymphyseal region is probably related to a more remote, prior injury as there is no discrete   fracture line seen on this exam.    Bones are demineralized.       Discharge Medications   Current Discharge Medication List      START taking these medications    Details   acetaminophen (TYLENOL) 325 MG tablet Take 2 tablets (650 mg) by mouth every 4 hours as needed for mild pain    Associated Diagnoses: Closed fracture of other parts of pelvis, initial encounter (H)         CONTINUE these medications which have NOT CHANGED    Details   alendronate (FOSAMAX) 70 MG tablet Take 70 mg by mouth every 7 days      atorvastatin (LIPITOR) 10 MG tablet Take 10 mg by mouth daily      CALCIUM PO Take by mouth daily      PARoxetine (PAXIL) 20 MG tablet Take 20 mg by mouth every morning      triamterene-HCTZ (DYAZIDE) 37.5-25 MG capsule Take 1 capsule by mouth every morning      VITAMIN D, CHOLECALCIFEROL, PO Take by mouth daily           Allergies   No Known Allergies

## 2021-06-17 NOTE — PLAN OF CARE
Physical Therapy Discharge Summary    Reason for therapy discharge:    Discharged to transitional care facility.    Progress towards therapy goal(s). See goals on Care Plan in [facility] electronic health record for goal details.  Goals not met.  Barriers to achieving goals:   discharge from facility.    Therapy recommendation(s):    Continued therapy is recommended.  Rationale/Recommendations:  PT at TCU to progress safety and independence with mobility.

## 2021-06-27 ENCOUNTER — HEALTH MAINTENANCE LETTER (OUTPATIENT)
Age: 81
End: 2021-06-27

## 2021-06-29 ENCOUNTER — RECORDS - HEALTHEAST (OUTPATIENT)
Dept: LAB | Facility: CLINIC | Age: 81
End: 2021-06-29

## 2021-06-29 LAB
ALBUMIN UR-MCNC: NEGATIVE G/DL
APPEARANCE UR: CLEAR
BILIRUB UR QL STRIP: NEGATIVE
COLOR UR AUTO: NORMAL
GLUCOSE UR STRIP-MCNC: NEGATIVE MG/DL
HGB UR QL STRIP: NEGATIVE
KETONES UR STRIP-MCNC: NEGATIVE MG/DL
LEUKOCYTE ESTERASE UR QL STRIP: NEGATIVE
NITRATE UR QL: NEGATIVE
PH UR STRIP: 6.5 [PH] (ref 5–8)
SP GR UR STRIP: 1.01 (ref 1–1.03)
UROBILINOGEN UR STRIP-ACNC: NORMAL

## 2021-06-30 ENCOUNTER — COMMUNICATION - HEALTHEAST (OUTPATIENT)
Dept: GERIATRICS | Facility: CLINIC | Age: 81
End: 2021-06-30

## 2021-06-30 LAB
ANION GAP SERPL CALCULATED.3IONS-SCNC: 12 MMOL/L (ref 5–18)
BACTERIA SPEC CULT: NO GROWTH
BUN SERPL-MCNC: 14 MG/DL (ref 8–28)
CALCIUM SERPL-MCNC: 8.5 MG/DL (ref 8.5–10.5)
CHLORIDE BLD-SCNC: 103 MMOL/L (ref 98–107)
CO2 SERPL-SCNC: 22 MMOL/L (ref 22–31)
CREAT SERPL-MCNC: 0.71 MG/DL (ref 0.6–1.1)
ERYTHROCYTE [DISTWIDTH] IN BLOOD BY AUTOMATED COUNT: 16.6 % (ref 11–14.5)
GFR SERPL CREATININE-BSD FRML MDRD: >60 ML/MIN/1.73M2
GLUCOSE BLD-MCNC: 96 MG/DL (ref 70–125)
HCT VFR BLD AUTO: 33.7 % (ref 35–47)
HGB BLD-MCNC: 10.3 G/DL (ref 12–16)
MCH RBC QN AUTO: 25.1 PG (ref 27–34)
MCHC RBC AUTO-ENTMCNC: 30.6 G/DL (ref 32–36)
MCV RBC AUTO: 82 FL (ref 80–100)
PLATELET # BLD AUTO: 284 THOU/UL (ref 140–440)
PMV BLD AUTO: 9.3 FL (ref 8.5–12.5)
POTASSIUM BLD-SCNC: 3.7 MMOL/L (ref 3.5–5)
RBC # BLD AUTO: 4.1 MILL/UL (ref 3.8–5.4)
SODIUM SERPL-SCNC: 137 MMOL/L (ref 136–145)
WBC: 4.7 THOU/UL (ref 4–11)

## 2021-07-02 ENCOUNTER — AMBULATORY - HEALTHEAST (OUTPATIENT)
Dept: GERIATRICS | Facility: CLINIC | Age: 81
End: 2021-07-02

## 2021-07-04 NOTE — TELEPHONE ENCOUNTER
Telephone Encounter by Marcos Olsen RN at 6/30/2021  3:51 PM     Author: Marcos Olsen RN Service: -- Author Type: Registered Nurse    Filed: 6/30/2021  3:51 PM Encounter Date: 6/30/2021 Status: Signed    : Marcos Olsen RN (Registered Nurse)       [facility] Nurse Triage Telephone Note      Provider: Raquel Rachel MD  Facility: [facility] Type: TCU    Caller: Melony  Call Back Number:  [phone]    Allergies: Patient has no known allergies.    Reason for call: Nurse calling to report Heme 2 and BMP results.  Notable meds:  Dyazide 37.5-25mg daily.       Verbal Order/Direction given by Provider: No new orders.      Provider giving order: Raquel Rachel MD    Verbal order given to: Melony Olsen RN

## 2021-07-13 ENCOUNTER — RECORDS - HEALTHEAST (OUTPATIENT)
Dept: ADMINISTRATIVE | Facility: CLINIC | Age: 81
End: 2021-07-13

## 2021-07-21 ENCOUNTER — RECORDS - HEALTHEAST (OUTPATIENT)
Dept: ADMINISTRATIVE | Facility: CLINIC | Age: 81
End: 2021-07-21

## 2021-10-17 ENCOUNTER — HEALTH MAINTENANCE LETTER (OUTPATIENT)
Age: 81
End: 2021-10-17

## 2021-11-29 ENCOUNTER — LAB REQUISITION (OUTPATIENT)
Dept: LAB | Facility: CLINIC | Age: 81
End: 2021-11-29
Payer: MEDICARE

## 2021-11-29 DIAGNOSIS — M81.0 AGE-RELATED OSTEOPOROSIS WITHOUT CURRENT PATHOLOGICAL FRACTURE: ICD-10-CM

## 2021-11-29 DIAGNOSIS — E78.5 HYPERLIPIDEMIA, UNSPECIFIED: ICD-10-CM

## 2021-11-29 DIAGNOSIS — E55.9 VITAMIN D DEFICIENCY, UNSPECIFIED: ICD-10-CM

## 2021-11-29 LAB
ALBUMIN SERPL-MCNC: 3.9 G/DL (ref 3.5–5)
ALP SERPL-CCNC: 88 U/L (ref 45–120)
ALT SERPL W P-5'-P-CCNC: 13 U/L (ref 0–45)
ANION GAP SERPL CALCULATED.3IONS-SCNC: 11 MMOL/L (ref 5–18)
AST SERPL W P-5'-P-CCNC: 18 U/L (ref 0–40)
BILIRUB SERPL-MCNC: 0.4 MG/DL (ref 0–1)
BUN SERPL-MCNC: 16 MG/DL (ref 8–28)
CALCIUM SERPL-MCNC: 9.1 MG/DL (ref 8.5–10.5)
CHLORIDE BLD-SCNC: 104 MMOL/L (ref 98–107)
CHOLEST SERPL-MCNC: 157 MG/DL
CO2 SERPL-SCNC: 25 MMOL/L (ref 22–31)
CREAT SERPL-MCNC: 0.77 MG/DL (ref 0.6–1.1)
GFR SERPL CREATININE-BSD FRML MDRD: 72 ML/MIN/1.73M2
GLUCOSE BLD-MCNC: 92 MG/DL (ref 70–125)
HDLC SERPL-MCNC: 62 MG/DL
LDLC SERPL CALC-MCNC: 79 MG/DL
POTASSIUM BLD-SCNC: 4.4 MMOL/L (ref 3.5–5)
PROT SERPL-MCNC: 7.2 G/DL (ref 6–8)
SODIUM SERPL-SCNC: 140 MMOL/L (ref 136–145)
TRIGL SERPL-MCNC: 79 MG/DL

## 2021-11-29 PROCEDURE — 80061 LIPID PANEL: CPT | Mod: ORL | Performed by: FAMILY MEDICINE

## 2021-11-29 PROCEDURE — 82306 VITAMIN D 25 HYDROXY: CPT | Mod: ORL | Performed by: FAMILY MEDICINE

## 2021-11-29 PROCEDURE — 80053 COMPREHEN METABOLIC PANEL: CPT | Mod: ORL | Performed by: FAMILY MEDICINE

## 2021-11-30 LAB — DEPRECATED CALCIDIOL+CALCIFEROL SERPL-MC: 54 UG/L (ref 30–80)

## 2022-01-27 ENCOUNTER — LAB REQUISITION (OUTPATIENT)
Dept: LAB | Facility: CLINIC | Age: 82
End: 2022-01-27
Payer: MEDICARE

## 2022-01-27 DIAGNOSIS — R30.0 DYSURIA: ICD-10-CM

## 2022-01-27 PROCEDURE — 87086 URINE CULTURE/COLONY COUNT: CPT | Mod: ORL | Performed by: STUDENT IN AN ORGANIZED HEALTH CARE EDUCATION/TRAINING PROGRAM

## 2022-01-29 LAB — BACTERIA UR CULT: NORMAL

## 2022-07-24 ENCOUNTER — HEALTH MAINTENANCE LETTER (OUTPATIENT)
Age: 82
End: 2022-07-24

## 2022-10-02 ENCOUNTER — HEALTH MAINTENANCE LETTER (OUTPATIENT)
Age: 82
End: 2022-10-02

## 2023-01-10 ENCOUNTER — LAB REQUISITION (OUTPATIENT)
Dept: LAB | Facility: CLINIC | Age: 83
End: 2023-01-10
Payer: MEDICARE

## 2023-01-10 DIAGNOSIS — E78.5 HYPERLIPIDEMIA, UNSPECIFIED: ICD-10-CM

## 2023-01-10 DIAGNOSIS — I10 ESSENTIAL (PRIMARY) HYPERTENSION: ICD-10-CM

## 2023-01-10 DIAGNOSIS — E55.9 VITAMIN D DEFICIENCY, UNSPECIFIED: ICD-10-CM

## 2023-01-10 LAB
ALBUMIN SERPL BCG-MCNC: 4.1 G/DL (ref 3.5–5.2)
ALP SERPL-CCNC: 94 U/L (ref 35–104)
ALT SERPL W P-5'-P-CCNC: 16 U/L (ref 10–35)
ANION GAP SERPL CALCULATED.3IONS-SCNC: 12 MMOL/L (ref 7–15)
AST SERPL W P-5'-P-CCNC: 21 U/L (ref 10–35)
BILIRUB SERPL-MCNC: 0.3 MG/DL
BUN SERPL-MCNC: 14.5 MG/DL (ref 8–23)
CALCIUM SERPL-MCNC: 9.2 MG/DL (ref 8.8–10.2)
CHLORIDE SERPL-SCNC: 101 MMOL/L (ref 98–107)
CHOLEST SERPL-MCNC: 182 MG/DL
CREAT SERPL-MCNC: 0.83 MG/DL (ref 0.51–0.95)
DEPRECATED HCO3 PLAS-SCNC: 24 MMOL/L (ref 22–29)
GFR SERPL CREATININE-BSD FRML MDRD: 70 ML/MIN/1.73M2
GLUCOSE SERPL-MCNC: 117 MG/DL (ref 70–99)
HDLC SERPL-MCNC: 80 MG/DL
LDLC SERPL CALC-MCNC: 85 MG/DL
NONHDLC SERPL-MCNC: 102 MG/DL
POTASSIUM SERPL-SCNC: 4 MMOL/L (ref 3.4–5.3)
PROT SERPL-MCNC: 6.6 G/DL (ref 6.4–8.3)
SODIUM SERPL-SCNC: 137 MMOL/L (ref 136–145)
TRIGL SERPL-MCNC: 87 MG/DL

## 2023-01-10 PROCEDURE — 82306 VITAMIN D 25 HYDROXY: CPT | Mod: ORL | Performed by: STUDENT IN AN ORGANIZED HEALTH CARE EDUCATION/TRAINING PROGRAM

## 2023-01-10 PROCEDURE — 80053 COMPREHEN METABOLIC PANEL: CPT | Mod: ORL | Performed by: STUDENT IN AN ORGANIZED HEALTH CARE EDUCATION/TRAINING PROGRAM

## 2023-01-10 PROCEDURE — 80061 LIPID PANEL: CPT | Mod: ORL | Performed by: STUDENT IN AN ORGANIZED HEALTH CARE EDUCATION/TRAINING PROGRAM

## 2023-01-11 LAB — DEPRECATED CALCIDIOL+CALCIFEROL SERPL-MC: 51 UG/L (ref 20–75)

## 2023-09-27 ENCOUNTER — HOSPITAL ENCOUNTER (EMERGENCY)
Facility: CLINIC | Age: 83
End: 2023-09-27
Payer: MEDICARE

## 2023-09-27 NOTE — ED NOTES
Expected Patient Referral to ED  1:07 PM    Referring Clinic/Provider:  [facility]    Reason for referral/Clinical facts:  Dizzy, feeling unwell, troponin elevated, dizzy.  CTA unremarkable.  Given heparin and ceftriaxone.    Recommendations provided:  Contact inpatient service to discuss direct admission or send to other ED given lack of cath lab.  Did discuss we could take patient but likely possibility they would need transferred    Caller was informed that this institution does not possess the capabilities and/or resources to provide for patient and should be transferred to a different facility due to lack of cath lab given troponin elevation and possible need for angiogram .    Discussed that if direct admit is sought and any hurdles are encountered, this ED would be happy to see the patient and evaluate.    Informed caller that recommendations provided are recommendations based only on the facts provided and that they responsible to accept or reject the advice, or to seek a formal in person consultation as needed and that this ED will see/treat patient should they arrive.      Mary Huerta DO  [facility] EMERGENCY ROOM  [address] [facility] [id number] [phone]       Mary Huerta DO  09/27/23 1314

## 2023-10-03 ENCOUNTER — LAB REQUISITION (OUTPATIENT)
Dept: LAB | Facility: CLINIC | Age: 83
End: 2023-10-03
Payer: MEDICARE

## 2023-10-03 DIAGNOSIS — Z87.828 PERSONAL HISTORY OF OTHER (HEALED) PHYSICAL INJURY AND TRAUMA: ICD-10-CM

## 2023-10-03 PROCEDURE — 80048 BASIC METABOLIC PNL TOTAL CA: CPT | Mod: ORL | Performed by: PHYSICIAN ASSISTANT

## 2023-10-04 LAB
ANION GAP SERPL CALCULATED.3IONS-SCNC: 13 MMOL/L (ref 7–15)
BUN SERPL-MCNC: 8.3 MG/DL (ref 8–23)
CALCIUM SERPL-MCNC: 9 MG/DL (ref 8.8–10.2)
CHLORIDE SERPL-SCNC: 104 MMOL/L (ref 98–107)
CREAT SERPL-MCNC: 0.73 MG/DL (ref 0.51–0.95)
DEPRECATED HCO3 PLAS-SCNC: 24 MMOL/L (ref 22–29)
EGFRCR SERPLBLD CKD-EPI 2021: 81 ML/MIN/1.73M2
GLUCOSE SERPL-MCNC: 91 MG/DL (ref 70–99)
POTASSIUM SERPL-SCNC: 4.6 MMOL/L (ref 3.4–5.3)
SODIUM SERPL-SCNC: 141 MMOL/L (ref 135–145)

## 2023-11-07 ENCOUNTER — LAB REQUISITION (OUTPATIENT)
Dept: LAB | Facility: CLINIC | Age: 83
End: 2023-11-07
Payer: MEDICARE

## 2023-11-07 DIAGNOSIS — I10 ESSENTIAL (PRIMARY) HYPERTENSION: ICD-10-CM

## 2023-11-07 LAB
ANION GAP SERPL CALCULATED.3IONS-SCNC: 13 MMOL/L (ref 7–15)
BUN SERPL-MCNC: 13.4 MG/DL (ref 8–23)
CALCIUM SERPL-MCNC: 9.2 MG/DL (ref 8.8–10.2)
CHLORIDE SERPL-SCNC: 100 MMOL/L (ref 98–107)
CREAT SERPL-MCNC: 0.84 MG/DL (ref 0.51–0.95)
DEPRECATED HCO3 PLAS-SCNC: 24 MMOL/L (ref 22–29)
EGFRCR SERPLBLD CKD-EPI 2021: 69 ML/MIN/1.73M2
GLUCOSE SERPL-MCNC: 96 MG/DL (ref 70–99)
POTASSIUM SERPL-SCNC: 3.9 MMOL/L (ref 3.4–5.3)
SODIUM SERPL-SCNC: 137 MMOL/L (ref 135–145)

## 2023-11-07 PROCEDURE — 80048 BASIC METABOLIC PNL TOTAL CA: CPT | Mod: ORL | Performed by: PHYSICIAN ASSISTANT

## 2024-03-09 ENCOUNTER — HEALTH MAINTENANCE LETTER (OUTPATIENT)
Age: 84
End: 2024-03-09

## 2024-06-19 ENCOUNTER — LAB REQUISITION (OUTPATIENT)
Dept: LAB | Facility: CLINIC | Age: 84
End: 2024-06-19
Payer: MEDICARE

## 2024-06-19 DIAGNOSIS — N39.0 URINARY TRACT INFECTION, SITE NOT SPECIFIED: ICD-10-CM

## 2024-06-19 PROCEDURE — 87086 URINE CULTURE/COLONY COUNT: CPT | Mod: ORL | Performed by: STUDENT IN AN ORGANIZED HEALTH CARE EDUCATION/TRAINING PROGRAM

## 2024-06-21 LAB — BACTERIA UR CULT: NORMAL

## 2024-07-08 ENCOUNTER — APPOINTMENT (OUTPATIENT)
Dept: MRI IMAGING | Facility: CLINIC | Age: 84
End: 2024-07-08
Attending: EMERGENCY MEDICINE
Payer: MEDICARE

## 2024-07-08 ENCOUNTER — HOSPITAL ENCOUNTER (EMERGENCY)
Facility: CLINIC | Age: 84
Discharge: HOME | End: 2024-07-08
Attending: EMERGENCY MEDICINE | Admitting: EMERGENCY MEDICINE
Payer: MEDICARE

## 2024-07-08 VITALS
DIASTOLIC BLOOD PRESSURE: 87 MMHG | BODY MASS INDEX: 27.16 KG/M2 | WEIGHT: 169 LBS | HEIGHT: 66 IN | HEART RATE: 65 BPM | OXYGEN SATURATION: 96 % | RESPIRATION RATE: 20 BRPM | TEMPERATURE: 97.7 F | SYSTOLIC BLOOD PRESSURE: 178 MMHG

## 2024-07-08 DIAGNOSIS — R42 DIZZINESS: ICD-10-CM

## 2024-07-08 LAB
ALBUMIN SERPL BCG-MCNC: 4 G/DL (ref 3.5–5.2)
ALBUMIN UR-MCNC: NEGATIVE MG/DL
ALP SERPL-CCNC: 87 U/L (ref 40–150)
ALT SERPL W P-5'-P-CCNC: 8 U/L (ref 0–50)
ANION GAP SERPL CALCULATED.3IONS-SCNC: 8 MMOL/L (ref 7–15)
APPEARANCE UR: CLEAR
APTT PPP: 27 SECONDS (ref 22–38)
AST SERPL W P-5'-P-CCNC: 22 U/L (ref 0–45)
ATRIAL RATE - MUSE: 69 BPM
BASOPHILS # BLD AUTO: 0 10E3/UL (ref 0–0.2)
BASOPHILS NFR BLD AUTO: 1 %
BILIRUB SERPL-MCNC: 0.3 MG/DL
BILIRUB UR QL STRIP: NEGATIVE
BUN SERPL-MCNC: 13.1 MG/DL (ref 8–23)
CALCIUM SERPL-MCNC: 8.7 MG/DL (ref 8.8–10.2)
CHLORIDE SERPL-SCNC: 102 MMOL/L (ref 98–107)
COLOR UR AUTO: ABNORMAL
CREAT SERPL-MCNC: 0.75 MG/DL (ref 0.51–0.95)
DEPRECATED HCO3 PLAS-SCNC: 27 MMOL/L (ref 22–29)
DIASTOLIC BLOOD PRESSURE - MUSE: NORMAL MMHG
EGFRCR SERPLBLD CKD-EPI 2021: 78 ML/MIN/1.73M2
EOSINOPHIL # BLD AUTO: 0.1 10E3/UL (ref 0–0.7)
EOSINOPHIL NFR BLD AUTO: 3 %
ERYTHROCYTE [DISTWIDTH] IN BLOOD BY AUTOMATED COUNT: 15.7 % (ref 10–15)
GLUCOSE SERPL-MCNC: 88 MG/DL (ref 70–99)
GLUCOSE UR STRIP-MCNC: NEGATIVE MG/DL
HCT VFR BLD AUTO: 35.8 % (ref 35–47)
HGB BLD-MCNC: 11.3 G/DL (ref 11.7–15.7)
HGB UR QL STRIP: NEGATIVE
IMM GRANULOCYTES # BLD: 0 10E3/UL
IMM GRANULOCYTES NFR BLD: 0 %
INR PPP: 0.97 (ref 0.85–1.15)
INTERPRETATION ECG - MUSE: NORMAL
KETONES UR STRIP-MCNC: NEGATIVE MG/DL
LEUKOCYTE ESTERASE UR QL STRIP: NEGATIVE
LYMPHOCYTES # BLD AUTO: 1.3 10E3/UL (ref 0.8–5.3)
LYMPHOCYTES NFR BLD AUTO: 30 %
MAGNESIUM SERPL-MCNC: 1.9 MG/DL (ref 1.7–2.3)
MCH RBC QN AUTO: 26 PG (ref 26.5–33)
MCHC RBC AUTO-ENTMCNC: 31.6 G/DL (ref 31.5–36.5)
MCV RBC AUTO: 82 FL (ref 78–100)
MONOCYTES # BLD AUTO: 0.5 10E3/UL (ref 0–1.3)
MONOCYTES NFR BLD AUTO: 12 %
MUCOUS THREADS #/AREA URNS LPF: PRESENT /LPF
NEUTROPHILS # BLD AUTO: 2.4 10E3/UL (ref 1.6–8.3)
NEUTROPHILS NFR BLD AUTO: 54 %
NITRATE UR QL: NEGATIVE
NRBC # BLD AUTO: 0 10E3/UL
NRBC BLD AUTO-RTO: 0 /100
P AXIS - MUSE: 44 DEGREES
PH UR STRIP: 7 [PH] (ref 5–7)
PLATELET # BLD AUTO: 239 10E3/UL (ref 150–450)
POTASSIUM SERPL-SCNC: 4.1 MMOL/L (ref 3.4–5.3)
PR INTERVAL - MUSE: 196 MS
PROT SERPL-MCNC: 6.9 G/DL (ref 6.4–8.3)
QRS DURATION - MUSE: 78 MS
QT - MUSE: 436 MS
QTC - MUSE: 467 MS
R AXIS - MUSE: -2 DEGREES
RBC # BLD AUTO: 4.35 10E6/UL (ref 3.8–5.2)
RBC URINE: 2 /HPF
SODIUM SERPL-SCNC: 137 MMOL/L (ref 135–145)
SP GR UR STRIP: 1.01 (ref 1–1.03)
SQUAMOUS EPITHELIAL: <1 /HPF
SYSTOLIC BLOOD PRESSURE - MUSE: NORMAL MMHG
T AXIS - MUSE: 14 DEGREES
TROPONIN T SERPL HS-MCNC: 15 NG/L
TROPONIN T SERPL HS-MCNC: 16 NG/L
TSH SERPL DL<=0.005 MIU/L-ACNC: 1.68 UIU/ML (ref 0.3–4.2)
UROBILINOGEN UR STRIP-MCNC: <2 MG/DL
VENTRICULAR RATE- MUSE: 69 BPM
WBC # BLD AUTO: 4.5 10E3/UL (ref 4–11)
WBC URINE: 1 /HPF

## 2024-07-08 PROCEDURE — G1010 CDSM STANSON: HCPCS

## 2024-07-08 PROCEDURE — 84443 ASSAY THYROID STIM HORMONE: CPT | Performed by: EMERGENCY MEDICINE

## 2024-07-08 PROCEDURE — 83735 ASSAY OF MAGNESIUM: CPT | Performed by: EMERGENCY MEDICINE

## 2024-07-08 PROCEDURE — 81001 URINALYSIS AUTO W/SCOPE: CPT | Performed by: EMERGENCY MEDICINE

## 2024-07-08 PROCEDURE — 250N000013 HC RX MED GY IP 250 OP 250 PS 637: Performed by: EMERGENCY MEDICINE

## 2024-07-08 PROCEDURE — 36415 COLL VENOUS BLD VENIPUNCTURE: CPT | Performed by: EMERGENCY MEDICINE

## 2024-07-08 PROCEDURE — 82040 ASSAY OF SERUM ALBUMIN: CPT | Performed by: EMERGENCY MEDICINE

## 2024-07-08 PROCEDURE — 84484 ASSAY OF TROPONIN QUANT: CPT | Performed by: EMERGENCY MEDICINE

## 2024-07-08 PROCEDURE — 255N000002 HC RX 255 OP 636: Performed by: EMERGENCY MEDICINE

## 2024-07-08 PROCEDURE — 93005 ELECTROCARDIOGRAM TRACING: CPT | Performed by: EMERGENCY MEDICINE

## 2024-07-08 PROCEDURE — 70549 MR ANGIOGRAPH NECK W/O&W/DYE: CPT | Mod: MG

## 2024-07-08 PROCEDURE — 85048 AUTOMATED LEUKOCYTE COUNT: CPT | Performed by: EMERGENCY MEDICINE

## 2024-07-08 PROCEDURE — 258N000003 HC RX IP 258 OP 636: Performed by: EMERGENCY MEDICINE

## 2024-07-08 PROCEDURE — 70553 MRI BRAIN STEM W/O & W/DYE: CPT | Mod: MG

## 2024-07-08 PROCEDURE — A9585 GADOBUTROL INJECTION: HCPCS | Performed by: EMERGENCY MEDICINE

## 2024-07-08 PROCEDURE — 85730 THROMBOPLASTIN TIME PARTIAL: CPT | Performed by: EMERGENCY MEDICINE

## 2024-07-08 PROCEDURE — 85610 PROTHROMBIN TIME: CPT | Mod: GZ | Performed by: EMERGENCY MEDICINE

## 2024-07-08 PROCEDURE — 96360 HYDRATION IV INFUSION INIT: CPT | Mod: 59

## 2024-07-08 PROCEDURE — 99285 EMERGENCY DEPT VISIT HI MDM: CPT | Mod: 25

## 2024-07-08 RX ORDER — MECLIZINE HYDROCHLORIDE 25 MG/1
25 TABLET ORAL 3 TIMES DAILY PRN
Qty: 20 TABLET | Refills: 0 | Status: SHIPPED | OUTPATIENT
Start: 2024-07-08

## 2024-07-08 RX ORDER — GADOBUTROL 604.72 MG/ML
10 INJECTION INTRAVENOUS ONCE
Status: COMPLETED | OUTPATIENT
Start: 2024-07-08 | End: 2024-07-08

## 2024-07-08 RX ORDER — MECLIZINE HYDROCHLORIDE 25 MG/1
25 TABLET ORAL ONCE
Status: COMPLETED | OUTPATIENT
Start: 2024-07-08 | End: 2024-07-08

## 2024-07-08 RX ADMIN — MECLIZINE HYDROCHLORIDE 25 MG: 25 TABLET ORAL at 15:09

## 2024-07-08 RX ADMIN — SODIUM CHLORIDE 1000 ML: 9 INJECTION, SOLUTION INTRAVENOUS at 15:09

## 2024-07-08 RX ADMIN — GADOBUTROL 10 ML: 604.72 INJECTION INTRAVENOUS at 20:05

## 2024-07-08 ASSESSMENT — ACTIVITIES OF DAILY LIVING (ADL)
ADLS_ACUITY_SCORE: 39

## 2024-07-08 ASSESSMENT — COLUMBIA-SUICIDE SEVERITY RATING SCALE - C-SSRS
1. IN THE PAST MONTH, HAVE YOU WISHED YOU WERE DEAD OR WISHED YOU COULD GO TO SLEEP AND NOT WAKE UP?: NO
6. HAVE YOU EVER DONE ANYTHING, STARTED TO DO ANYTHING, OR PREPARED TO DO ANYTHING TO END YOUR LIFE?: NO
2. HAVE YOU ACTUALLY HAD ANY THOUGHTS OF KILLING YOURSELF IN THE PAST MONTH?: NO

## 2024-07-08 NOTE — ED TRIAGE NOTES
"PT states around 3pm yesterday she developed dizziness, \"felt off\", and had difficulty with her speech. She said she had a tough time with word finding. She states she speech is normal today however still feels dizzy.     Triage Assessment (Adult)       Row Name 07/08/24 1410          Triage Assessment    Airway WDL WDL        Respiratory WDL    Respiratory WDL WDL        Skin Circulation/Temperature WDL    Skin Circulation/Temperature WDL WDL        Cardiac WDL    Cardiac WDL WDL        Peripheral/Neurovascular WDL    Peripheral Neurovascular WDL WDL        Cognitive/Neuro/Behavioral WDL    Cognitive/Neuro/Behavioral WDL WDL                     "

## 2024-07-08 NOTE — ED PROVIDER NOTES
"EMERGENCY DEPARTMENT ENCOUNTER      NAME: Alivia Brown  AGE: 83 year old female  YOB: 1940  MRN: [id number]  EVALUATION DATE & TIME: 7/8/2024  2:16 PM    PCP: No Ref-Primary, Physician    ED PROVIDER: Fely Davis MD      Chief Complaint   Patient presents with    Dizziness         FINAL IMPRESSION:  1. Dizziness          ED COURSE & MEDICAL DECISION MAKING:    Pertinent Labs & Imaging studies reviewed. (See chart for details)    2:22 PM I introduced myself to the patient, obtained patient history, performed a physical exam, and discussed plan for ED workup including potential diagnostic laboratory/imaging studies and interventions.  9:42 PM I updated the patient on lab and imaging results. Discussed discharge. Patient to be discharged by ED RN.    83 year old female with a pertinent history of HTN, HLD, and chronic pain who presents to this ED for evaluation of dizziness and word finding difficulties that began approximately 24 hours ago.  Have been overall improving but decided to come get evaluated today.  Apparently she was talking on the phone with a friend when she began to feel \"off.\"  She states she felt somewhat dizzy and  noted that she was having some trouble finding words but her speech sounded normal otherwise.  No facial droop.  No numbness tingling weakness.  On exam here does not have any focal neurologic deficit.  Reports some mild current dizziness but feels that her speech is back to normal.  No obvious sign of cerebellar deficit on my exam.  She is able to ambulate here without difficulty.  As she is 24 hours out of the start of her symptoms she is not in any window for tenecteplase or thrombectomy and thus stroke code was not called.  However did consider CVA and will obtain MRI of the brain with and without contrast as well as MRA of the head and neck for further evaluation of possible stroke, mass, intracranial hemorrhage, etc.  Possible this could also be " peripheral vertigo.       EKG was obtained which reveals sinus rhythm with no evidence of acute ischemia.  Initial troponin slightly above normal at 16 however repeat is downtrending to 15.  She denies any chest pain or shortness of breath and overall felt that acute coronary syndrome was less likely.  Laboratory workup largely unrevealing.  TSH within normal limits.  CMP and magnesium unremarkable.  Coagulation studies within normal limits.  Urinalysis without sign of UTI.  CBC reveals a white blood cell count of 4.5.  Hemoglobin 11.3.  She was given 25 mg of oral meclizine as well as a liter of IV fluids.  Vital signs here are within normal limits and she is afebrile.  MRI does not reveal any recent infarction intracranial mass or evidence of intracranial hemorrhage.  Mild to moderate presumed chronic small vessel ischemic change.  MRA of the head and neck also unremarkable.  Thus felt that CVA was unlikely.  On reevaluation she states she is feeling back to her baseline.  She would very much like to be discharged at this time.  Discussed we do not know the exact cause of her symptoms.  Did discuss the potential of vertigo and will write a prescription for meclizine for her to use as needed at home.  Advise it is very important she follow-up closely with her primary care doctor.  Given indications for return the emergency department.  Patient and her  are in agreement with the plan.  She was able to ambulate here without difficulty.  She was discharged home in stable condition.         At the conclusion of the encounter I discussed the results of all of the tests and the disposition. The questions were answered. The patient or family acknowledged understanding and was agreeable with the care plan.         Medical Decision Making  Obtained supplemental history:Supplemental history obtained?: Family Member/Significant Other  Reviewed external records: External records reviewed?: Documented in chart  Care  "impacted by chronic illness:Chronic Pain, Hyperlipidemia, and Hypertension  Care significantly affected by social determinants of health:N/A  Did you consider but not order tests?: Work up considered but not performed and documented in chart, if applicable  Did you interpret images independently?: Independent interpretation of ECG and images noted in documentation, when applicable.  Consultation discussion with other provider:Did you involve another provider (consultant, , pharmacy, etc.)?: No  Discharge. No recommendations on prescription strength medication(s). See documentation for any additional details.      MEDICATIONS GIVEN IN THE EMERGENCY:  Medications   sodium chloride 0.9% BOLUS 1,000 mL (0 mLs Intravenous Stopped 7/8/24 1620)   meclizine (ANTIVERT) tablet 25 mg (25 mg Oral $Given 7/8/24 1509)   gadobutrol (GADAVIST) injection 10 mL (10 mLs Intravenous $Given 7/8/24 2005)       NEW PRESCRIPTIONS STARTED AT TODAY'S ER VISIT  New Prescriptions    No medications on file          =================================================================    HPI    Patient information was obtained from: Patient,     Use of : N/A         Alivia Brown is a 83 year old female with a pertinent history of HTN, HLD, and chronic pain who presents to this ED for evaluation of dizziness.    Yesterday at 3 PM, the patient was sitting in a chair and on the phone with a friend when she endorsed a sudden onset of dizziness and word finding difficulty. She got off the phone and immediately told her  about her symptoms. Prior to the onset of dizziness and word finding difficulty, she had been making jam in the morning and notes she began to feel \"off\" at 2 PM. Patient states that today, her word finding difficulty has resolved but reports persistent dizziness, lightheadedness and headache. Patient denies facial droop ( confirms), balance or walking issues, vision changes, numbness, weakness, or " hearing changes. No known history of a stroke. Not on anticoagulation.     She also denies any recent falls, chest pain, shortness of breath, cough, runny nose, fever, nausea, vomiting, diarrhea, or abdominal pain. Patient does note bruising to her left foot due to what she believes was a bug bite that occurred indoors 4 weeks ago but denies any other complaints at this time. Did not note a tick or any target rash.       REVIEW OF SYSTEMS   Pertinent positives and negatives are documented in the HPI. All other systems reviewed and are negative.      PAST MEDICAL HISTORY:  Past Medical History:   Diagnosis Date    Acquired hallux valgus 06/12/2021    Anxiety state 6/12/2021    Anxiety state 06/12/2021    Benign essential hypertension 6/12/2021    Benign essential hypertension 06/12/2021    Chronic pain 06/12/2021    Contracture of palmar fascia 06/12/2021    Disorder of right patellofemoral joint 06/12/2021    Dry eyes 06/12/2021    Hearing loss 6/12/2021    Hearing loss 06/12/2021    History of infectious disease 12/11/2020    History of recent fall     Mechanical Fall    HLD (hyperlipidemia)     HTN (hypertension)     Hyperlipidemia 6/12/2021    Hyperlipidemia 06/12/2021    Left hip pain     Osteoporosis 6/12/2021    Osteoporosis 06/12/2021    Pain in right knee 06/12/2021    Parotitis 06/12/2021    Pelvic fracture (H) 06/12/2021    LEFT    Recurrent major depression (H24) 6/12/2021    Recurrent major depression (H24) 06/12/2021    Urge incontinence of urine 06/12/2021       PAST SURGICAL HISTORY:  Past Surgical History:   Procedure Laterality Date    WRIST SURGERY Left     WRIST SURGERY Left            CURRENT MEDICATIONS:    acetaminophen (TYLENOL) 325 MG tablet  alendronate (FOSAMAX) 70 MG tablet  atorvastatin (LIPITOR) 10 MG tablet  CALCIUM PO  PARoxetine (PAXIL) 20 MG tablet  triamterene-HCTZ (DYAZIDE) 37.5-25 MG capsule  VITAMIN D, CHOLECALCIFEROL, PO        ALLERGIES:  No Known Allergies    FAMILY  "HISTORY:  History reviewed. No pertinent family history.    SOCIAL HISTORY:   Social History     Socioeconomic History    Marital status:    Tobacco Use    Smoking status: Never    Smokeless tobacco: Never   Substance and Sexual Activity    Alcohol use: Yes     Alcohol/week: 3.0 standard drinks of alcohol     Comment: Alcoholic Drinks/day: 3 drinks of vodka per week    Drug use: Never    Sexual activity: Not Currently     Social Determinants of Health     Food Insecurity: No Food Insecurity (9/28/2023)    Received from [facility] Vital Sign     Worried About Running Out of Food in the Last Year: Never true     Ran Out of Food in the Last Year: Never true       VITALS:  BP (!) 156/85   Pulse 63   Temp 97.7  F (36.5  C) (Temporal)   Resp 18   Ht 1.676 m (5' 6\")   Wt 76.7 kg (169 lb)   SpO2 96%   BMI 27.28 kg/m      PHYSICAL EXAM    Physical Exam  Constitutional: Well developed, Well nourished, NAD, GCS 15  HENT: Normocephalic, Atraumatic, Bilateral external ears normal, Oropharynx normal, mucous membranes moist, Nose normal. Neck-  Normal range of motion, No tenderness, Supple, No stridor.    Eyes: PERRL, EOMI, Conjunctiva normal, No discharge.   Respiratory: Normal breath sounds, No respiratory distress, No wheezing or crackles, Speaks in full sentences easily.    Cardiovascular: Normal heart rate, Regular rhythm,  No murmurs, No rubs, No gallops. 2+ radial pulses bilaterally  GI: Bowel sounds normal, Soft, No tenderness, No masses, No rebound or guarding.   Musculoskeletal: 2+ DP pulses. No notable lower extremity edema. No cyanosis, No clubbing. Good range of motion in all major joints. No tenderness to palpation or major deformities noted.   Integument: Warm, Dry, No erythema, No rash. No petechiae.   Neurologic: Alert & oriented x 3, 5/5 strength in all 4 extremities bilaterally. Sensation intact to light touch in all 4 extremities and the face bilaterally. No focal deficits noted. " Normal gait. CN 2- 12 intact bilaterally. Speech is normal at this time. No visual field deficits. Finger to nose and heel to shin intact bilaterally.  No pronator drift.  No obvious nystagmus.    Psychiatric: Affect normal, Judgment normal, Mood normal. Cooperative.      LAB:  All pertinent labs reviewed and interpreted.  Results for orders placed or performed during the hospital encounter of 07/08/24   MR Brain w/o & w Contrast    Impression    IMPRESSION:  HEAD MRI:  1.  No recent infarct, intracranial mass or evidence of intracranial hemorrhage.  2.  Mild to moderate presumed chronic small vessel ischemic changes.    HEAD MRA:  1.  No aneurysm, high flow AVM or significant stenosis identified.  2.  Variant [place] of [place] anatomy as above.    NECK MRA:  Normal neck MRA.     MRA Neck (Carotids) wo & w Contrast    Impression    IMPRESSION:  HEAD MRI:  1.  No recent infarct, intracranial mass or evidence of intracranial hemorrhage.  2.  Mild to moderate presumed chronic small vessel ischemic changes.    HEAD MRA:  1.  No aneurysm, high flow AVM or significant stenosis identified.  2.  Variant [place] of [place] anatomy as above.    NECK MRA:  Normal neck MRA.     MRA Brain ([place] of [place]) wo Contrast    Impression    IMPRESSION:  HEAD MRI:  1.  No recent infarct, intracranial mass or evidence of intracranial hemorrhage.  2.  Mild to moderate presumed chronic small vessel ischemic changes.    HEAD MRA:  1.  No aneurysm, high flow AVM or significant stenosis identified.  2.  Variant [place] of [place] anatomy as above.    NECK MRA:  Normal neck MRA.     Comprehensive metabolic panel   Result Value Ref Range    Sodium 137 135 - 145 mmol/L    Potassium 4.1 3.4 - 5.3 mmol/L    Carbon Dioxide (CO2) 27 22 - 29 mmol/L    Anion Gap 8 7 - 15 mmol/L    Urea Nitrogen 13.1 8.0 - 23.0 mg/dL    Creatinine 0.75 0.51 - 0.95 mg/dL    GFR Estimate 78 >60 mL/min/1.73m2    Calcium 8.7 (L) 8.8 - 10.2 mg/dL    Chloride 102 98 - 107  mmol/L    Glucose 88 70 - 99 mg/dL    Alkaline Phosphatase 87 40 - 150 U/L    AST 22 0 - 45 U/L    ALT 8 0 - 50 U/L    Protein Total 6.9 6.4 - 8.3 g/dL    Albumin 4.0 3.5 - 5.2 g/dL    Bilirubin Total 0.3 <=1.2 mg/dL   Result Value Ref Range    Troponin T, High Sensitivity 16 (H) <=14 ng/L   Result Value Ref Range    Magnesium 1.9 1.7 - 2.3 mg/dL   TSH with free T4 reflex   Result Value Ref Range    TSH 1.68 0.30 - 4.20 uIU/mL   Partial thromboplastin time   Result Value Ref Range    aPTT 27 22 - 38 Seconds   Result Value Ref Range    INR 0.97 0.85 - 1.15   UA with Microscopic reflex to Culture    Specimen: Urine, Midstream   Result Value Ref Range    Color Urine Light Yellow Colorless, Straw, Light Yellow, Yellow    Appearance Urine Clear Clear    Glucose Urine Negative Negative mg/dL    Bilirubin Urine Negative Negative    Ketones Urine Negative Negative mg/dL    Specific Gravity Urine 1.008 1.001 - 1.030    Blood Urine Negative Negative    pH Urine 7.0 5.0 - 7.0    Protein Albumin Urine Negative Negative mg/dL    Urobilinogen Urine <2.0 <2.0 mg/dL    Nitrite Urine Negative Negative    Leukocyte Esterase Urine Negative Negative    Mucus Urine Present (A) None Seen /LPF    RBC Urine 2 <=2 /HPF    WBC Urine 1 <=5 /HPF    Squamous Epithelials Urine <1 <=1 /HPF   CBC with platelets and differential   Result Value Ref Range    WBC Count 4.5 4.0 - 11.0 10e3/uL    RBC Count 4.35 3.80 - 5.20 10e6/uL    Hemoglobin 11.3 (L) 11.7 - 15.7 g/dL    Hematocrit 35.8 35.0 - 47.0 %    MCV 82 78 - 100 fL    MCH 26.0 (L) 26.5 - 33.0 pg    MCHC 31.6 31.5 - 36.5 g/dL    RDW 15.7 (H) 10.0 - 15.0 %    Platelet Count 239 150 - 450 10e3/uL    % Neutrophils 54 %    % Lymphocytes 30 %    % Monocytes 12 %    % Eosinophils 3 %    % Basophils 1 %    % Immature Granulocytes 0 %    NRBCs per 100 WBC 0 <1 /100    Absolute Neutrophils 2.4 1.6 - 8.3 10e3/uL    Absolute Lymphocytes 1.3 0.8 - 5.3 10e3/uL    Absolute Monocytes 0.5 0.0 - 1.3 10e3/uL     Absolute Eosinophils 0.1 0.0 - 0.7 10e3/uL    Absolute Basophils 0.0 0.0 - 0.2 10e3/uL    Absolute Immature Granulocytes 0.0 <=0.4 10e3/uL    Absolute NRBCs 0.0 10e3/uL   Troponin T, High Sensitivity (now)   Result Value Ref Range    Troponin T, High Sensitivity 15 (H) <=14 ng/L   ECG 12-LEAD WITH MUSE (LHE)   Result Value Ref Range    Systolic Blood Pressure  mmHg    Diastolic Blood Pressure  mmHg    Ventricular Rate 69 BPM    Atrial Rate 69 BPM    RI Interval 196 ms    QRS Duration 78 ms     ms    QTc 467 ms    P Axis 44 degrees    R AXIS -2 degrees    T Axis 14 degrees    Interpretation ECG       Sinus rhythm  Normal ECG  When compared with ECG of 11-MAY-2005 13:39,  Nonspecific T wave abnormality now evident in Anterior leads  Confirmed by SEE ED PROVIDER NOTE FOR, ECG INTERPRETATION ([id number]),  EMILY PATRICIO ([id number]) on 7/8/2024 3:33:38 PM         RADIOLOGY:  Reviewed all pertinent imaging. Please see official radiology report.  MR Brain w/o & w Contrast   Final Result   IMPRESSION:   HEAD MRI:   1.  No recent infarct, intracranial mass or evidence of intracranial hemorrhage.   2.  Mild to moderate presumed chronic small vessel ischemic changes.      HEAD MRA:   1.  No aneurysm, high flow AVM or significant stenosis identified.   2.  Variant [place] of [place] anatomy as above.      NECK MRA:   Normal neck MRA.         MRA Neck (Carotids) wo & w Contrast   Final Result   IMPRESSION:   HEAD MRI:   1.  No recent infarct, intracranial mass or evidence of intracranial hemorrhage.   2.  Mild to moderate presumed chronic small vessel ischemic changes.      HEAD MRA:   1.  No aneurysm, high flow AVM or significant stenosis identified.   2.  Variant [place] of [place] anatomy as above.      NECK MRA:   Normal neck MRA.         MRA Brain ([place] of [place]) wo Contrast   Final Result   IMPRESSION:   HEAD MRI:   1.  No recent infarct, intracranial mass or evidence of intracranial hemorrhage.   2.  Mild to moderate  presumed chronic small vessel ischemic changes.      HEAD MRA:   1.  No aneurysm, high flow AVM or significant stenosis identified.   2.  Variant [place] of [place] anatomy as above.      NECK MRA:   Normal neck MRA.             EKG:    Performed at: 7/8/24 at 14:48:48    Impression: Sinus rhythm. No evidence of acute ischemia.      Rate: 69 BPM  Rhythm: Sinus rhythm  Axis: -2  TN Interval: 196 ms  QRS Interval: 78 ms  QTc Interval: 467 ms  ST Changes: No ST changes  Comparison: When compared with ECG from 5/11/2005 at 13:39, unchanged     I have independently reviewed and interpreted the EKG(s) documented above.      [employer] System Documentation:   CMS Diagnoses:               I, Giselle Knapp, am serving as a scribe to document services personally performed by Fely Davis MD based on my observation and the provider's statements to me. I, Fely Davis MD, attest that Giselle Knapp is acting in a scribe capacity, has observed my performance of the services and has documented them in accordance with my direction.    Fely Davis MD  [facility] EMERGENCY ROOM  [address] [id number] [phone]     Fely Davis MD  07/11/24 [id number]

## 2024-07-09 NOTE — DISCHARGE INSTRUCTIONS
Follow-up with your primary care doctor next week for reevaluation.  Your MRI does not show any signs of a stroke.  Can take the meclizine as needed if you develop dizziness.    Return to the ER for any new or worsening concerns including return of difficulty speaking, numbness, tingling, weakness, confusion, or any new or worsening concerns

## 2024-09-26 ENCOUNTER — LAB REQUISITION (OUTPATIENT)
Dept: LAB | Facility: CLINIC | Age: 84
End: 2024-09-26
Payer: MEDICARE

## 2024-09-26 DIAGNOSIS — R44.1 VISUAL HALLUCINATIONS: ICD-10-CM

## 2024-09-26 PROCEDURE — 87186 SC STD MICRODIL/AGAR DIL: CPT | Mod: ORL | Performed by: STUDENT IN AN ORGANIZED HEALTH CARE EDUCATION/TRAINING PROGRAM

## 2024-09-28 LAB — BACTERIA UR CULT: ABNORMAL

## 2024-10-10 ENCOUNTER — LAB REQUISITION (OUTPATIENT)
Dept: LAB | Facility: CLINIC | Age: 84
End: 2024-10-10
Payer: MEDICARE

## 2024-10-10 DIAGNOSIS — R29.818 OTHER SYMPTOMS AND SIGNS INVOLVING THE NERVOUS SYSTEM: ICD-10-CM

## 2024-10-10 DIAGNOSIS — M25.50 PAIN IN UNSPECIFIED JOINT: ICD-10-CM

## 2024-10-10 DIAGNOSIS — R44.1 VISUAL HALLUCINATIONS: ICD-10-CM

## 2024-10-10 LAB
ALBUMIN SERPL BCG-MCNC: 4 G/DL (ref 3.5–5.2)
ALP SERPL-CCNC: 100 U/L (ref 40–150)
ALT SERPL W P-5'-P-CCNC: 13 U/L (ref 0–50)
ANION GAP SERPL CALCULATED.3IONS-SCNC: 13 MMOL/L (ref 7–15)
AST SERPL W P-5'-P-CCNC: 19 U/L (ref 0–45)
BILIRUB SERPL-MCNC: 0.4 MG/DL
BUN SERPL-MCNC: 18.2 MG/DL (ref 8–23)
CALCIUM SERPL-MCNC: 9 MG/DL (ref 8.8–10.4)
CHLORIDE SERPL-SCNC: 100 MMOL/L (ref 98–107)
CREAT SERPL-MCNC: 1.03 MG/DL (ref 0.51–0.95)
CRP SERPL-MCNC: 76.2 MG/L
EGFRCR SERPLBLD CKD-EPI 2021: 53 ML/MIN/1.73M2
ERYTHROCYTE [SEDIMENTATION RATE] IN BLOOD BY WESTERGREN METHOD: 35 MM/HR (ref 0–30)
GLUCOSE SERPL-MCNC: 104 MG/DL (ref 70–99)
HCO3 SERPL-SCNC: 22 MMOL/L (ref 22–29)
POTASSIUM SERPL-SCNC: 4.4 MMOL/L (ref 3.4–5.3)
PROT SERPL-MCNC: 7.1 G/DL (ref 6.4–8.3)
SODIUM SERPL-SCNC: 135 MMOL/L (ref 135–145)
VIT B12 SERPL-MCNC: 181 PG/ML (ref 232–1245)

## 2024-10-10 PROCEDURE — 85652 RBC SED RATE AUTOMATED: CPT | Mod: ORL | Performed by: STUDENT IN AN ORGANIZED HEALTH CARE EDUCATION/TRAINING PROGRAM

## 2024-10-10 PROCEDURE — 87086 URINE CULTURE/COLONY COUNT: CPT | Mod: ORL | Performed by: STUDENT IN AN ORGANIZED HEALTH CARE EDUCATION/TRAINING PROGRAM

## 2024-10-10 PROCEDURE — 80053 COMPREHEN METABOLIC PANEL: CPT | Mod: ORL | Performed by: STUDENT IN AN ORGANIZED HEALTH CARE EDUCATION/TRAINING PROGRAM

## 2024-10-10 PROCEDURE — 86140 C-REACTIVE PROTEIN: CPT | Mod: ORL | Performed by: STUDENT IN AN ORGANIZED HEALTH CARE EDUCATION/TRAINING PROGRAM

## 2024-10-10 PROCEDURE — 82607 VITAMIN B-12: CPT | Mod: ORL | Performed by: STUDENT IN AN ORGANIZED HEALTH CARE EDUCATION/TRAINING PROGRAM

## 2024-10-11 LAB — BACTERIA UR CULT: NO GROWTH

## 2024-10-31 ENCOUNTER — TRANSFERRED RECORDS (OUTPATIENT)
Dept: HEALTH INFORMATION MANAGEMENT | Facility: CLINIC | Age: 84
End: 2024-10-31
Payer: MEDICARE

## 2024-11-01 ENCOUNTER — MEDICAL CORRESPONDENCE (OUTPATIENT)
Dept: HEALTH INFORMATION MANAGEMENT | Facility: CLINIC | Age: 84
End: 2024-11-01
Payer: MEDICARE

## 2024-11-04 ENCOUNTER — TRANSCRIBE ORDERS (OUTPATIENT)
Dept: OTHER | Age: 84
End: 2024-11-04

## 2024-11-04 DIAGNOSIS — H53.47 HEMIANOPSIA: Primary | ICD-10-CM

## 2024-12-10 ENCOUNTER — LAB REQUISITION (OUTPATIENT)
Dept: LAB | Facility: CLINIC | Age: 84
End: 2024-12-10
Payer: MEDICARE

## 2024-12-10 DIAGNOSIS — E53.8 DEFICIENCY OF OTHER SPECIFIED B GROUP VITAMINS: ICD-10-CM

## 2024-12-10 LAB — ERYTHROCYTE [SEDIMENTATION RATE] IN BLOOD BY WESTERGREN METHOD: 7 MM/HR (ref 0–30)

## 2024-12-10 PROCEDURE — 86140 C-REACTIVE PROTEIN: CPT | Mod: ORL | Performed by: STUDENT IN AN ORGANIZED HEALTH CARE EDUCATION/TRAINING PROGRAM

## 2024-12-10 PROCEDURE — 82607 VITAMIN B-12: CPT | Mod: ORL | Performed by: STUDENT IN AN ORGANIZED HEALTH CARE EDUCATION/TRAINING PROGRAM

## 2024-12-10 PROCEDURE — 85652 RBC SED RATE AUTOMATED: CPT | Mod: ORL | Performed by: STUDENT IN AN ORGANIZED HEALTH CARE EDUCATION/TRAINING PROGRAM

## 2024-12-11 LAB
CRP SERPL-MCNC: <3 MG/L
VIT B12 SERPL-MCNC: 419 PG/ML (ref 232–1245)

## 2025-01-22 ENCOUNTER — ANCILLARY PROCEDURE (OUTPATIENT)
Dept: MRI IMAGING | Facility: CLINIC | Age: 85
End: 2025-01-22
Attending: STUDENT IN AN ORGANIZED HEALTH CARE EDUCATION/TRAINING PROGRAM
Payer: MEDICARE

## 2025-01-22 DIAGNOSIS — H53.47 HEMIANOPSIA: ICD-10-CM

## 2025-01-22 DIAGNOSIS — R41.89 COGNITIVE IMPAIRMENT: ICD-10-CM

## 2025-01-22 PROCEDURE — 255N000002 HC RX 255 OP 636: Performed by: STUDENT IN AN ORGANIZED HEALTH CARE EDUCATION/TRAINING PROGRAM

## 2025-01-22 PROCEDURE — A9585 GADOBUTROL INJECTION: HCPCS | Performed by: STUDENT IN AN ORGANIZED HEALTH CARE EDUCATION/TRAINING PROGRAM

## 2025-01-22 PROCEDURE — 70553 MRI BRAIN STEM W/O & W/DYE: CPT

## 2025-01-22 RX ORDER — GADOBUTROL 604.72 MG/ML
0.1 INJECTION INTRAVENOUS ONCE
Status: COMPLETED | OUTPATIENT
Start: 2025-01-22 | End: 2025-01-22

## 2025-01-22 RX ADMIN — GADOBUTROL 7.5 ML: 604.72 INJECTION INTRAVENOUS at 13:18

## 2025-01-23 ENCOUNTER — ORDERS ONLY (AUTO-RELEASED) (OUTPATIENT)
Dept: NEUROLOGY | Facility: CLINIC | Age: 85
End: 2025-01-23
Payer: MEDICARE

## 2025-01-23 DIAGNOSIS — I63.9 CEREBROVASCULAR ACCIDENT (CVA), UNSPECIFIED MECHANISM (H): Primary | ICD-10-CM

## 2025-01-23 DIAGNOSIS — I63.9 CEREBROVASCULAR ACCIDENT (CVA), UNSPECIFIED MECHANISM (H): ICD-10-CM

## 2025-02-12 LAB — CV ZIO PRELIM RESULTS: NORMAL

## 2025-02-13 ENCOUNTER — MYC MEDICAL ADVICE (OUTPATIENT)
Dept: NEUROLOGY | Facility: CLINIC | Age: 85
End: 2025-02-13
Payer: MEDICARE

## 2025-03-16 ENCOUNTER — HEALTH MAINTENANCE LETTER (OUTPATIENT)
Age: 85
End: 2025-03-16

## 2025-04-14 ENCOUNTER — OFFICE VISIT (OUTPATIENT)
Dept: NEUROLOGY | Facility: CLINIC | Age: 85
End: 2025-04-14
Attending: STUDENT IN AN ORGANIZED HEALTH CARE EDUCATION/TRAINING PROGRAM
Payer: MEDICARE

## 2025-04-14 DIAGNOSIS — I63.9 CEREBROVASCULAR ACCIDENT (CVA), UNSPECIFIED MECHANISM (H): ICD-10-CM

## 2025-04-14 DIAGNOSIS — F41.9 ANXIETY: ICD-10-CM

## 2025-04-14 DIAGNOSIS — F06.70 MILD NEUROCOGNITIVE DISORDER DUE TO MULTIPLE ETIOLOGIES: Primary | ICD-10-CM

## 2025-04-14 DIAGNOSIS — R41.89 COGNITIVE IMPAIRMENT: ICD-10-CM

## 2025-04-14 DIAGNOSIS — F33.1 MODERATE EPISODE OF RECURRENT MAJOR DEPRESSIVE DISORDER (H): ICD-10-CM

## 2025-04-14 DIAGNOSIS — H53.47 HEMIANOPSIA: ICD-10-CM

## 2025-04-14 NOTE — LETTER
4/14/2025      Alivia Brown  [address] [place] [address]      Dear Colleague,    Thank you for referring your patient, Alivia Brown, to the [facility]UR[facility]IC [facility]. Please see a copy of my visit note below.    NEUROPSYCHOLOGY EVALUATION  [facility]      NAME: Alivia Brown    YOB: 1940   AGE: 84 years old  EDU: 12  DATE OF EVALUATION: 4/14/2025    REASON FOR REFERRAL:  Ms. Brown is a 84 year-old, right-handed, White female with hypertension, hyperlipidemia, major depressive disorder, anxiety, and recent history of right occipital stroke in the Fall of 2024. Due to concerns about cognitive decline, she was referred for this neuropsychological evaluation by neurologist, Ankur Sun MD, in order to assist with differential diagnosis and care planning.     SUMMARY OF FINDINGS: (please refer to Extended Report below for full details and comprehensive clinical history)  Results of testing indicate that Ms. Brown is of estimated average premorbid intellectual functioning; however, several of Ms. Brown's performances fall well below that estimate. Specifically, she exhibits mild to severe impairment on measures of executive functioning, including mental flexibility/set shifting, cognitive inhibition, visual planning/organization, and abstract reasoning.  In addition, Ms. Brown's nonverbal (visual) memory retrieval is markedly impaired (despite intact learning of that same material).  Conversely, her verbal learning is lpglmmmsvi-ow-qadivh impaired, but her memory of verbal material is fully intact.  The patient's semantic fluency score is mildly impaired and is significantly lower than her average-range phonemic fluency.  There is significant variability in Ms. Brown's processing speed scores (which range from severely impaired to average), and in her attention/concentration (which ranges from mildly impaired to average).   Lastly, several of the patient's visual-spatial/constructional abilities are also below expectation, including moderately impaired copy of a complex figure, which is notable for significant distortion, a piecemeal approach, omission of several detailed elements, perseveration, and general imprecision.  In addition, her copy of simpler figures is also slightly below expectation in the borderline impaired range.  Her ability to bisect lines on a page reveals a slight shift to the right.    It is important to note that despite the significant visuospatial processing issues noted above, those deficits do not fully explain/account for all of her low test scores.  For example, Ms. Brown performs well on tests of reading, visual scanning (which is quick and without error), and naming visual stimuli.  Furthermore, there are several tasks that are administered via auditory-verbal methods (with no visual stimuli) that were still impaired, including tests of attention/concentration, some tests of processing speed, some tests of executive functioning, and verbal learning.    With regard to learning/memory more specifically, Ms. Brown's learning of nonverbal information is within expectation, but she is unable to recall any of that information over time (0% retention rate).  However, her recall significantly benefits from prompts/cues on the recognition trial, on which her score improves back up to the average range.  This performance suggests a significant retrieval deficit.  In contrast, the patient's verbal learning is slightly below expectation, falling in the rixvwtfaxd-ns-pmjpde impaired range.  However, her memory of that same information is fully intact.    All other cognitive test performances are within normal limits, including those on all other measures of language processing (e.g., confrontation naming, single-word reading), visuoconstruction using three-dimensional blocks, phonemic fluency, and (as noted above)  visual scanning, nonverbal learning efficiency, and verbal memory.    Emotionally, the patient endorses moderate depressive symptoms and mild anxiety on self-report questionnaires.  She acknowledges that she typically becomes more depressed in the winter when there is less to do and she stays home more often.  She prefers to be more social.  Her  denied having any significant concerns about the patient's emotional wellbeing.  He has also not observed any significant personality change.    IMPRESSIONS:  Overall, these results are suggestive of mild-to-moderate bilateral frontal/subcortical dysfunction.  Although her visuospatial/constructional skills are below expectation and could plicate the nondominant parietal lobe, these abilities might also be affected by her fairly recent right occipital lobe stroke.  Despite these current cognitive difficulties, Ms. Brown remains fairly independent in daily life.  As such, she currently meets criteria for Mild Neurocognitive Disorder.  Etiology is somewhat uncertain, as this type of cognitive profile is fairly nonspecific.  That said, I suspect that there may be a combination of neurologic and possibly non-neurologic factors at play, including:  Ongoing sequelae from her right occipital stroke, which could contribute to some of her visuospatial/constructional deficits and possibly her visual hallucinations that have been occurring less frequently over time.  However, this stroke would not fully explain all of her cognitive impairments. If the stroke is contributing to some of her visuoconstructional deficits and hallucinations, then she may continue to experience ongoing improvement in those symptoms over time.  Recovery from stroke typically starts to plateau after about a year.  Other cerebrovascular disease (chronic small vessel ischemic changes) could also potentially account for some of her frontal/subcortical dysfunction (her 2024 MRI noted a mild-to-moderate  degree of chronic small vessel ischemia, although her 2025 MRI only categorized as mild).  Possible RAMIREZ/sleep-disordered breathing, as reported by her  who sleeps in a separate room.  If the patient has untreated sleep apnea, this could contribute to frontal/subcortical deficits, visuoconstructional impairment, and even active sleeping.  Treatment of RAMIREZ (if present) could elicit some degree of improvement in these symptoms over time.  Moderately depressed mood and anxiety may exacerbate her cognitive difficulties to some degree, and can contribute to variability in her cognitive performance, both on testing and in daily life.  Improvements in her mood could also lead to some improvements in her cognitive functioning.  The patient also reports a history of possible verbal learning disability, as she always struggled with learning how to read and write.  This could potentially explain some of her verbal learning inefficiencies on testing.  However, assessing for learning disabilities is outside of the scope of this evaluation.  Although Ms. Brown has some symptoms that are consistent with a Lewy body dementia (LBD) process (e.g., frontal/subcortical dysfunction, visuospatial/constructional impairments, visual hallucinations, tremor, and possible REM sleep behavior disorder reported by her ) these symptoms are all fairly nonspecific and could be fully explained by the other factors mentioned above.  In addition, many of the clinical characteristics began acutely, presumably at the time of her stroke, which might argue against an emerging LBD process (which typically onsets gradually). That said, ongoing monitoring would be appropriate.  At this time, there is no compelling evidence of an Alzheimer's disease process.  Her profile is also not strongly supportive of posterior cortical atrophy (and again, her recent occipital stroke could fully account for some of those symptoms she is  demonstrating).    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     If not medically contraindicated, Ms. Brown may benefit from a trial of a medication for her cognitive impairment (e.g., rivastigmine, since donepezil was not well tolerated).  Some studies suggest that rivastigmine has shown potential benefits in folks with vascular cognitive impairment and in LBD (although it is less likely that the patient has LBD at this time).  This recommendation will be deferred to her neurologist.    Consider a referral to Sleep Medicine in order to evaluate for sleep disordered breathing/RAMIREZ and for possible REM sleep behavior disorder. This recommendation will be deferred to the patient's neurologist or PCP.    The patient may benefit from establishing care with our psychologist, Yaritza Bowen PsyD, LP ([facility]; [phone]). Dr. Bowen specializes in working with older adults with cognitive issues as well as their loved ones. If the patient is amenable, I will have our schedulers call her to set up an intake appointment.    Consider an adjustment to the patient's psychiatric medication to help better manage her depression/anxiety.    Because of her executive dysfunction, occasional oversight with complex daily activities would be appropriate, particularly with regard to  cooking, medication management, and financial management.  The patient stopped driving after her stroke, which remains appropriate (at least for now) in light of her visuospatial impairment, executive dysfunction, and significant variability in attention and processing speed.    Consider a referral to Occupational Therapy for a CPT assessment, which may help clarify her functional capabilities and  clarify the level of support needed at home.  Occupational Therapy could also help the patient compensate for her visuospatial difficulties and other cognitive deficits.    Ms. Brown reported taking a sleep aid nightly that contains doxylamine, which has anticholinergic properties that can affect memory. She is encouraged to discontinue this medication and re-try melatonin, or talk with her doctor about a prescription for sleep initiation if needed.    The patient is encouraged to utilize cognitive compensatory strategies in daily life, including utilizing note pads, checklists, to-do lists, a calendar/planner, labeled alarm reminders, a GPS, a pillbox, and maintaining a daily morning and nighttime routine and an organized living/work environment. Performing important/complex tasks or having important conversations should be done in a quiet, distraction-free environment (this includes putting away the cell phone, turning off the TV or music in the background, etc.).     Ms. Brown is strongly encouraged to adhere closely to her medication regimen to help keep her cerebrovascular risk factors (e.g., hypertension, hyperlipidemia, etc.) well controlled. This may help to prevent further cognitive decline in the future.    Ms. Brown is encouraged to remain physically, socially, and mentally active in order to optimize her brain health.    Neuropsychological follow-up is recommended in 12-18 months (or as clinically indicated) in order to monitor her cognitive status, help to clarify etiology, and update recommendations. The current test data can be used as a baseline to which future comparisons can be made.        Thank you for allowing me to participate in Ms. Brown's care. Please contact me with any questions regarding the content of this report.        Dinah Grajeda PsyD,   Clinical Neuropsychologist  [facility] [address], [address]  Phone: [phone]  Fax:  [phone]          --------------------------------EXTENDED REPORT--------------------------------  The following information was obtained via medical record review and by interview of the patient and her , Jeremiah.    HISTORY OF PRESENTING PROBLEM:  Medical records, Ms. Brown experienced an acute episode of dizziness and word finding difficulty, for which she was evaluated in the [facility] ED on 7/8/2024.  Brain MRI/MRA at that time was negative for any acute intracranial abnormalities.  Her symptoms improved in the ED, so she was discharged home with meclizine.  Her dizziness persisted and became much worse on 9/16/2024, so she presented to the urgency room.  Head CT was again unremarkable.  She was instructed to follow-up with her PCP.  At some point shortly thereafter, the patient began developing visual hallucinations and vision issues.  Her PCP referred her to ophthalmology, whose examination revealed left homonymous hemianopsia.  She was subsequently referred to neurology.  She also began working with providers at the [facility] to help manage her dizziness and vision issues.    Ms. Brown established care with neurologist, Ankur Sun MD, on 1/10/2025.  During that visit, the patient reported ongoing visual hallucinations, ongoing dizziness, and other cognitive concerns.  He scored 18/30 on a brief cognitive screen.  Her neurologic examination otherwise revealed ongoing left homonymous hemianopsia.  Per Dr. Sun's assessment:     Alivia Brown is a pleasant 84 year old female who presents in consultation.  She has had a rather abrupt left sided homonymous hemianopsia and issues with dizziness.  Some of these may have occurred after her initial MRI/MRA, reviewed together and unrevealing (this seemed to have been obtained 2/2 dizziness but visual changes have progressed subsequently).  Discussed her brain did not have alzheimers features (hippocampi looked good) or PCA  "features (Alzheimers variant).  Given her visuospatial limitations I have suspicion for blossoming lewy body spectrum phenomena but this is confounded by any possible new stroke (need to update MRI to exclude R occipital lobe stroke). Discussed aricept risk/benefit, designed for limited Alzheimers progression but has lewy body data.    Dr. Sun referred the patient for an updated brain MRI and this neuropsychological evaluation, and started her on a trial of Aricept.  Updated brain MRI dated 1/22/2025 revealed evidence of a prior right occipital lobe infarct (as clinically suspected) superimposed upon chronic microvascular ischemic disease.  The patient eventually discontinued Aricept due to unwanted side effects.    CURRENT CLINICAL INTERVIEW:  Today, Ms. Brown reported experiencing ongoing vision issues, noting that her vision is blurred.  She stated, \"they say my vision is normal, but I know something is wrong.\"  Her  (Jeremiah) noted that she is sometimes unable to see things that are right in front of her ever since the stroke.  The patient agreed with this, noting that if the object is clear (e.g., a glass cup on the table) or blends in with its surroundings, she is unlikely to see it.  She also reported that she is bumping into things more often.  At the same time, Jeremiah noted that she is able to do word searches and crossword puzzles without any difficulty seeing those things.  Along with these vision/visual spatial issues, the patient reported that she is \"a little concerned\" about her memory.  She finds that she loses things more often.  However, she denied forgetting recent conversations or events, and is not getting lost in familiar places.  Upon further questioning, she endorses feeling mentally slower since the stroke.  Jeremiah agreed.  She also had significant word finding difficulty around the time of the stroke, but this has gotten much better over time.  The patient and her  both " reported that they had no concerns about her cognitive functioning prior to the acute dizziness spell in July 2024.    Ms. Brown continues to experience visual hallucinations, but much less frequently now.  These began following the onset of her dizziness last summer/fall.  She would primarily see a lady in a bonnet with a small child, and then she started seeing animals.  She would see things fairly vividly, both at night and during the day.  They did not bother her.  Jeremiah agreed that her visual hallucinations seem less frequent, or at least she is mentioning them less frequently as time has gone on.    With regard to the activities of daily living, Ms. Brown reported that she is fully independent.  She currently lives with her  in a single-level home, where they have resided since 1997.  She stopped driving after the stroke because of her vision symptoms.  Prior to the stroke, she had no issues with driving.  Jeremiah agreed.  She manages her medications independently with the use of a pillbox, and remember to take them on her own without any difficulty.  Jeremiah has always managed the bills and finances.  The patient continues to cook, perform household chores and errands, and utilize familiar appliances and devices without issue.    MEDICAL HISTORY:  Long with the previously described right occipital stroke, occasional dizziness, and visual hallucinations, Ms. Brown's medical history is additionally significant for the following:   Patient Active Problem List   Diagnosis     Anxiety state     Benign essential hypertension     Chronic pain     Dry eyes     Hearing loss     Hyperlipidemia     Osteoporosis     Recurrent major depression     Pelvic fracture (H)     The patient reported that her dizziness has improved significantly, although she still has episodes of dizziness that can come out of the blue or with positional change.  Her balance has improved after working with providers at [facility].  She denied having  any recent falls.    Although the patient did not mention having a tremor during the clinical interview, a mild tremor was observed in her dominant (right) hand during pencil-and-paper tests during today's assessment.    The patient started breaking out in a rash a couple of weeks ago.  It does not itch or bother her, aside from having an unwanted appearance.  She was told by provider that she is probably allergic to something.  She has a follow-up for this later this week.  She also noted that she has aged spots on her skin that have a hair follicle in them.  She has been picking at the hair follicle more often, to the point where she might bleed and form scabs.    With regard to pain, the patient reported that she occasionally has problems with her knees.  Other pain was denied.  She feels fairly comfortable today.    The patient reported that her sleep is normal and largely undisturbed. She estimates that she is typically getting 12 hours of sleep per night and reported that she feels well rested in the morning.  Nocturia was denied (there are only some nights when she has to get up once to use the bathroom).  She takes melatonin every night.  Her  noted that they sleep in separate bedrooms because she snores and acts out her dreams on occasion.  He has not witnessed any clear apneic spells.  She has never had a sleep study.    The patient denied any history of significant head injuries, known seizure, or microsmia/anosmia.  She wears bilateral hearing aids, which are effective in correcting her for her hearing loss.    Past Surgical History:   Procedure Laterality Date     WRIST SURGERY Left      WRIST SURGERY Left      Diagnostic studies:  Most recent brain MRI dated 1/22/2025 revealed:  FINDINGS:  INTRACRANIAL CONTENTS: Right inferior occipital region of encephalomalacia/gliosis (axial series 6, images 11-17). No corresponding abnormal diffusion signal, signal loss on susceptibility-weighted images or  "corresponding postcontrast enhancement. Finding is new since comparison MRI [dated 7/8/2024]. No acute or subacute infarct. No mass, acute hemorrhage, or extra-axial fluid collections. Scattered nonspecific T2/FLAIR hyperintensities within the cerebral white matter most consistent with mild chronic microvascular ischemic change. Mild to moderate generalized cerebral atrophy. No hydrocephalus. Normal position of the cerebellar tonsils.    Current medications include (per medical record):   Current Outpatient Medications:      acetaminophen (TYLENOL) 325 MG tablet, Take 2 tablets (650 mg) by mouth every 4 hours as needed for mild pain, Disp: , Rfl:      alendronate (FOSAMAX) 70 MG tablet, Take 70 mg by mouth every 7 days, Disp: , Rfl:      Ascorbic Acid 500 MG CAPS, Take 1 capsule by mouth daily., Disp: , Rfl:      atorvastatin (LIPITOR) 10 MG tablet, Take 10 mg by mouth daily, Disp: , Rfl:      CALCIUM PO, Take by mouth daily, Disp: , Rfl:      donepezil (ARICEPT) 5 MG tablet, Take 1 tablet (5 mg) by mouth at bedtime., Disp: 90 tablet, Rfl: 1     hydroCHLOROthiazide 12.5 MG tablet, 1 tablet in the morning Orally Once a day for 90 days, Disp: , Rfl:      meclizine (ANTIVERT) 25 MG tablet, Take 1 tablet (25 mg) by mouth 3 times daily as needed for dizziness, Disp: 20 tablet, Rfl: 0     PARoxetine (PAXIL) 20 MG tablet, Take 20 mg by mouth every morning, Disp: , Rfl:      triamterene-HCTZ (DYAZIDE) 37.5-25 MG capsule, Take 1 capsule by mouth every morning, Disp: , Rfl:      VITAMIN D, CHOLECALCIFEROL, PO, Take by mouth daily, Disp: , Rfl:     RELEVANT FAMILY MEDICAL HISTORY:   There is no known neurologic family history.      PSYCHIATRIC HISTORY:  With regard to her psychiatric history, medical records state that Ms. Brown has a history of recurrent major depressive disorder and anxiety.  There is no history of psychiatric hospitalization or suicide attempt.  Currently, the patient described her mood as \"a little " "depressed now and then,\" which she largely attributed to not being as active or as social in the winter.  Her recent symptoms and numerous appointments/evaluations have been fairly stressful for her.  She denied experiencing any significant anxiety.  Jeremiah denied having any significant concerns about her emotional wellbeing at this time.  He has also not noticed any significant personality change.    With regard to substance use, Ms. Brown is a former smoker but quit in her 50s.  Other historical substance abuse was denied.  Currently, she might have one or maybe two vodka tonics in a sitting, but not every day. Other current substance use was denied.    SOCIAL HISTORY:  Ms. Brown was born and raised in a [place] community near [place], [place].  She attended school in a one room country school house, where a single teacher taught all grade levels.  The patient recalled struggling to learn how to read and write.  That said, she also noted that her father was an alcoholic, and her mother was having a difficult time raising the seven children largely on her own.  The patient earned mostly C's throughout her schooling.  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  She retired from [employer]. She has been  for 60 years, and they have 3 children together along with seven grandchildren. The patient and her  live together in their own home in [place], [place].    BEHAVIORAL OBSERVATIONS:   Ms. Brown arrived on time and accompanied by her   to today's appointment. She was appropriately dressed and groomed. She appeared alert and engaged. She ambulated independently but did bump into furniture a couple of times when settling into her chair in my office. She had a tendency to pick at the skin on her hands. Very occasional hearing difficulties were " "observed despite use of bilateral hearing aids. Conversational speech was of normal rate, volume, and prosody. Occasional word-finding pauses and paraphasic errors were noted (e.g., she called the eye doctor a \"dentist\" on 3 different occasions). Her thought process appeared linear and goal-directed. No hallucinations or delusions were apparent today. Judgment and insight appeared intact. Her mood was euthymic and her affect was appropriately reactive. Rapport was easily established and eye contact was appropriate.     During the testing session, Ms. Brown was alert throughout but became slightly fatigued as the testing session progressed. She was pleasant, jocular, and cooperative throughout the evaluation.  She exhibited a slight tremor in her hand during pencil-and-paper tasks.  She seemed to be easily distracted when the examiner was relaying test instructions to her, and often needed the instructions to be repeated.  There were also some times when she seemed to have difficulty hearing the test instructions, but her hearing issues did not seem to negatively impact her performance during tasks.  There were also times when she seemed to forget task demands partway through the test.  She exhibited some occasional word finding difficulties and occasionally long delays before responding.  Aside from some distractibility and ongoing tendency to pick at her hands, no other frontal signs were observed behaviorally. Ms. Brown appeared adequately motivated and engaged easily during testing. Overall, the following results are considered a reasonably valid estimation of her current cognitive abilities.    TESTS ADMINISTERED:   Wechsler Memory Scale-III (WMS-III) select subtests, Wechsler Adult Intelligence Scale-IV (WAIS-IV) select subtests, Wide Range Achievement Test-5 (WRAT-5) select subtests, Wechsler Memory Scale-IV (WMS-IV) select subtests, [place] Verbal Learning Test-3 Short Form (CVLT-3 Short), Oral Trail " Making Test, Denia Edwards Executive Functioning System (DKEFS) select subtests, Stroop, Controlled Oral Word Association Test (COWAT) and Category Fluency, Gilford Naming Test-2 (BNT-2), Sridhar-Osterrieth Complex Figure Test (RCFT) and Clock Drawing Test, Line Bisection test, Generalized Anxiety Disorder-7 Assessment (RAMÍREZ-7) and Geriatric Depression Scale-Short Form (GDS-15).    MOANS norms were used for BNT, Trail Making Test A & B, COWAT & Category Fluency, Stroop, Sridhar-O Copy   Corwin Keys, & Tad (2010) norms used for Oral Trail Making test    DESCRIPTIVE PERFORMANCE KEY:    Labels for tests with Normal Distributions  Score Label Standard Score %ile Rank   Exceptionally high score  > 130 > 98   Above average score 120-129 91-97   High average score 110-119 75-90   Average score  25-74   Low average score 80-89 9-24   Below average score 70-79 2-8   Exceptionally low score < 70 < 2     Labels for tests with Non-Normal Distributions  Score Label %ile Rank   Within normal expectations/ limits score (WNL) > 24   Low average score 9-24   Below average score 2-8   Exceptionally low score < 2     The following test results utilize score labels as adapted from Elvin Sr, Jasmeet Patel, Yudith Crespo, JESSICA Leija, Efren Georges Michael Westerveld & Conference Participatnts (2020): American Academy of Clinical Neuropsychology consensus conference statement on uniform labeling of performance test scores, The Clinical Neuropsychologist, DOI: 10.1080/[email or web address]. All scores contain some measure of error; scores are reported here as they are obtained by the individual (without reference to the range of error). These are meant as labels and not interpretation of performance. While other relevant comments regarding task performance are provided below, please see the Summary, Impressions, and Diagnosis sections of this report for interpretation of the scores and the  "cognitive profile as a whole, including what does and does not constitute impairment for this particular individual.    OPTIMAL PREMORBID INTELLECT:  Optimal premorbid intellectual abilities were estimated as falling in the average range based on Ms. Brown's educational and occupational histories and performance on tasks least likely to be affected by acquired brain dysfunction.    SUMMARY OF TEST RESULTS:  ORIENTATION. Performance on a mental status exam, assessing orientation to personal and current information, resulted in a low average score. She was oriented to personal information, place, time, and most of the date (although she stated it was \"May\" instead of April) and was able to correctly name the current and previous presidents.  She also noted that the day of the week was \"Wednesday\" instead of Monday.    ATTENTION/WORKING MEMORY. The patient's score on a measure sensitive to sustained auditory-verbal attention and concentration (WAIS-IV Digit Span) was classified as below average, as she was able to recite up to 4 digits forward (a low average score), up to 4 digits backward (an average score), and up to 2 digits in sequence (a below average score).  On the DKEFS Trail Making Test, visual scanning was average completion time and with 0 omission errors.     PROCESSING SPEED. Speeded digit-symbol coding was average (WAIS-IV Coding).  On the DKEFS Trail Making Test, simple motor speed was exceptionally low, as was speeded number sequencing and speeded letter sequencing. On a test of speeded numeric sequencing that does not require use of a pencil and paper (Oral Trails A), the patient's score was exceptionally low for completion time and with 1 error. On the Stroop test, speeded color naming was low average, and speeded word reading was average.     LANGUAGE PROCESSING. Language comprehension appeared intact. Confrontation naming was measured as an average score (47/60; BNT-2). The patient's performance on " a test of phonemic fluency resulted in an average score (COWAT), and her semantic fluency was below average (Category Fluency). Her writing sample was intact and there was no evidence of micrographia.     VISUOSPATIAL/CONSTRUCTIONAL SKILLS. The patient's ability to bisect lines on the page was notable for a slight shift to the right on the majority of items (Line Bisection test). Vvisuoconstruction with three-dimensional blocks was average (WAIS-IV Block Design). Copy of a complex geometric figure was notably impaired due to a piecemeal approach, distortion of the figures gestalt, omission and misplacement of several detailed elements, perseveration of some details, and general imprecision (RCFT Copy). Copy of simple geometric figures was also below expectation (WMS-IV Visual Reproduction Copy). On a Clock Drawing Task, the patient was able to draw a large, well-formed [place] with equally spaced and correctly placed numbers. Her clock hands converged nicely in the center of the clock face and were well differentiated by length.      LEARNING/MEMORY. On the WMS-IV Logical Memory subtest, immediate memory for two paragraph-length stories resulted in a low average score. After a 20-minute delay, the patient's score on the delayed recall trial was average with a 55% retention rate. On the recognition trial, the patient's score was classified as average.    On a 9-item verbal list-learning task (CVLT-3 Short Form), the patient acquired up to 3 words of the word list by the 4th and final learning trial (raw scores over trials = 3, 3, 3, 3). Total learning acquisition was exceptionally low. Following a brief, 30-second distractor task, the patient recalled 4 items, which indicated a retention rate of 133%. After a 10-minute delay, the patient recalled 4 items, which was low average and again with a 133% retention rate. Her recall did not benefit from semantic cues (4 items; below average). Recognition discriminability was  "below average, as she recognized 7/9 items but also made 6 false-positive errors.     On a visual memory measure (WMS-IV Visual Reproduction), immediate recall of simple geometric figures was low average, while delayed recall of the figures was below average (with a 0% retention rate). Delayed recognition of the figures was average.     EXECUTIVE FUNCTIONS. On a test of inhibition and cognitive flexibility (Stroop), the patient's score was below average for completion time and made 3 errors. On a test that requires speeded alpha-numeric sequencing/cognitive set-shifting with pencil and paper (DKEFS Dolores Making Test), the task had to be discontinued at the maximum time limit (the patient reached item \"9\" after 6 minutes and had made three errors, which is average).  On a similar test of alpha-numeric sequencing/cognitive set-shifting that does NOT require use of the pencil and paper, the patient score was exceptionally low and with two errors (Oral Trails Part Two).  Phonemic fluency was average (COWAT). On the Clock Drawing Test, the patient was unable to accurately represent analog time.    MOOD. On the GDS-15 a self report measure of depressive symptomatology, she obtained a score of 8, placing her in the range of moderate depressive symptoms. On the RAMÍREZ-7, a self-report measure of anxiety, she obtained a score of 5,  placing her in the range of mild anxiety.    ____________________________________________________________________________________    SERVICES PROVIDED & TIME:  On-site Office Visit Details   Verbal consent for neuropsychological testing was received following the provision of information about the nature and purpose of the evaluation, and the opportunity to ask questions. Verbal permission to route a copy of the final report to her primary care provider was also obtained.  A clinical interview/neurobehavioral status examination was conducted with the patient and documented. I thoroughly reviewed the " medical record, selected the neuropsychological test battery, provided supervision to the trained examiner/technician, interpreted/integrated patient data and test results, and engaged in clinical decision making, treatment planning, report writing/preparation, and provision of interactive feedback of test results on 4/21/2025 . A trained examiner/technician administered and scored the neuropsychological tests (2+ tests).  Please see below for a breakdown of time spent and the associated codes billed for these services.  Services   Time Spent  CPT Codes   Neurobehavioral Status Exam:  (e.g., clinical interview and neurobehavioral status exam, interpretation, report)   84 minutes   1 x 96116     Neuropsychological Evaluation Services:   (e.g., integration, interpretation, treatment planning, clinical decision making, feedback of test results)   225 minutes   1 x 96132  3 x 96133   Neuropsychological Testing by Trained Examiner/Technician:  (e.g., test administration, scoring, 2+ tests administered)   140 minutes   1 x 96138  4 x 96139   For diagnostic and coding purposes, Ms. Brown has a history of hypertension, hyperlipidemia, major depressive disorder, anxiety, and recent history of right occipital stroke in the Fall of 2024. 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 Please note, all charges are filed at the completion of the Episode of Care and associated with the final encounter date (feedback session on 4/21/2025).         The patient was seen for a neuropsychological evaluation for the purposes of diagnostic clarification and treatment planning. 120 minutes of face-to-face testing were provided by this writer. An additional 20 minutes were spent scoring and compiling test results. The patient was cooperative with testing. No concerns were brought to my attention. Please see Dr. Grajeda's report for a detailed description of the charges and interpretation and integration of the  findings.      Again, thank you for allowing me to participate in the care of your patient.        Sincerely,        Dinah Grajeda Psy.D, LP    Electronically signed

## 2025-04-14 NOTE — PROGRESS NOTES
The patient was seen for a neuropsychological evaluation for the purposes of diagnostic clarification and treatment planning. 120 minutes of face-to-face testing were provided by this writer. An additional 20 minutes were spent scoring and compiling test results. The patient was cooperative with testing. No concerns were brought to my attention. Please see Dr. Grajeda's report for a detailed description of the charges and interpretation and integration of the findings.

## 2025-04-14 NOTE — PROGRESS NOTES
NEUROPSYCHOLOGY EVALUATION  [facility]      NAME: Alivia Brown    YOB: 1940   AGE: 84 years old  EDU: 12  DATE OF EVALUATION: 4/14/2025    REASON FOR REFERRAL:  Ms. Brown is a 84 year-old, right-handed, White female with hypertension, hyperlipidemia, major depressive disorder, anxiety, and recent history of right occipital stroke in the Fall of 2024. Due to concerns about cognitive decline, she was referred for this neuropsychological evaluation by neurologist, Ankur Sun MD, in order to assist with differential diagnosis and care planning.     SUMMARY OF FINDINGS: (please refer to Extended Report below for full details and comprehensive clinical history)  Results of testing indicate that Ms. Brown is of estimated average premorbid intellectual functioning; however, several of Ms. Brown's performances fall well below that estimate. Specifically, she exhibits mild to severe impairment on measures of executive functioning, including mental flexibility/set shifting, cognitive inhibition, visual planning/organization, and abstract reasoning.  In addition, Ms. Brown's nonverbal (visual) memory retrieval is markedly impaired (despite intact learning of that same material).  Conversely, her verbal learning is seigzmjorc-rb-njauid impaired, but her memory of verbal material is fully intact.  The patient's semantic fluency score is mildly impaired and is significantly lower than her average-range phonemic fluency.  There is significant variability in Ms. Brown's processing speed scores (which range from severely impaired to average), and in her attention/concentration (which ranges from mildly impaired to average).  Lastly, several of the patient's visual-spatial/constructional abilities are also below expectation, including moderately impaired copy of a complex figure, which is notable for significant distortion, a piecemeal approach, omission of several detailed  elements, perseveration, and general imprecision.  In addition, her copy of simpler figures is also slightly below expectation in the borderline impaired range.  Her ability to bisect lines on a page reveals a slight shift to the right.    It is important to note that despite the significant visuospatial processing issues noted above, those deficits do not fully explain/account for all of her low test scores.  For example, Ms. Brown performs well on tests of reading, visual scanning (which is quick and without error), and naming visual stimuli.  Furthermore, there are several tasks that are administered via auditory-verbal methods (with no visual stimuli) that were still impaired, including tests of attention/concentration, some tests of processing speed, some tests of executive functioning, and verbal learning.    With regard to learning/memory more specifically, Ms. Brown's learning of nonverbal information is within expectation, but she is unable to recall any of that information over time (0% retention rate).  However, her recall significantly benefits from prompts/cues on the recognition trial, on which her score improves back up to the average range.  This performance suggests a significant retrieval deficit.  In contrast, the patient's verbal learning is slightly below expectation, falling in the yhfjrnvtee-gl-mgnens impaired range.  However, her memory of that same information is fully intact.    All other cognitive test performances are within normal limits, including those on all other measures of language processing (e.g., confrontation naming, single-word reading), visuoconstruction using three-dimensional blocks, phonemic fluency, and (as noted above) visual scanning, nonverbal learning efficiency, and verbal memory.    Emotionally, the patient endorses moderate depressive symptoms and mild anxiety on self-report questionnaires.  She acknowledges that she typically becomes more depressed in the winter  when there is less to do and she stays home more often.  She prefers to be more social.  Her  denied having any significant concerns about the patient's emotional wellbeing.  He has also not observed any significant personality change.    IMPRESSIONS:  Overall, these results are suggestive of mild-to-moderate bilateral frontal/subcortical dysfunction.  Although her visuospatial/constructional skills are below expectation and could plicate the nondominant parietal lobe, these abilities might also be affected by her fairly recent right occipital lobe stroke.  Despite these current cognitive difficulties, Ms. Brown remains fairly independent in daily life.  As such, she currently meets criteria for Mild Neurocognitive Disorder.  Etiology is somewhat uncertain, as this type of cognitive profile is fairly nonspecific.  That said, I suspect that there may be a combination of neurologic and possibly non-neurologic factors at play, including:  Ongoing sequelae from her right occipital stroke, which could contribute to some of her visuospatial/constructional deficits and possibly her visual hallucinations that have been occurring less frequently over time.  However, this stroke would not fully explain all of her cognitive impairments. If the stroke is contributing to some of her visuoconstructional deficits and hallucinations, then she may continue to experience ongoing improvement in those symptoms over time.  Recovery from stroke typically starts to plateau after about a year.  Other cerebrovascular disease (chronic small vessel ischemic changes) could also potentially account for some of her frontal/subcortical dysfunction (her 2024 MRI noted a mild-to-moderate degree of chronic small vessel ischemia, although her 2025 MRI only categorized as mild).  Possible RAMIREZ/sleep-disordered breathing, as reported by her  who sleeps in a separate room.  If the patient has untreated sleep apnea, this could contribute to  frontal/subcortical deficits, visuoconstructional impairment, and even active sleeping.  Treatment of RAMIREZ (if present) could elicit some degree of improvement in these symptoms over time.  Moderately depressed mood and anxiety may exacerbate her cognitive difficulties to some degree, and can contribute to variability in her cognitive performance, both on testing and in daily life.  Improvements in her mood could also lead to some improvements in her cognitive functioning.  The patient also reports a history of possible verbal learning disability, as she always struggled with learning how to read and write.  This could potentially explain some of her verbal learning inefficiencies on testing.  However, assessing for learning disabilities is outside of the scope of this evaluation.  Although Ms. Brown has some symptoms that are consistent with a Lewy body dementia (LBD) process (e.g., frontal/subcortical dysfunction, visuospatial/constructional impairments, visual hallucinations, tremor, and possible REM sleep behavior disorder reported by her ) these symptoms are all fairly nonspecific and could be fully explained by the other factors mentioned above.  In addition, many of the clinical characteristics began acutely, presumably at the time of her stroke, which might argue against an emerging LBD process (which typically onsets gradually). That said, ongoing monitoring would be appropriate.  At this time, there is no compelling evidence of an Alzheimer's disease process.  Her profile is also not strongly supportive of posterior cortical atrophy (and again, her recent occipital stroke could fully account for some of those symptoms she is demonstrating).    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     If not medically contraindicated, Ms. Brown may benefit from a trial of a medication for her cognitive impairment (e.g., rivastigmine, since donepezil was not well tolerated).  Some studies suggest that rivastigmine has shown potential benefits in folks with vascular cognitive impairment and in LBD (although it is less likely that the patient has LBD at this time).  This recommendation will be deferred to her neurologist.    Consider a referral to Sleep Medicine in order to evaluate for sleep disordered breathing/RAMIREZ and for possible REM sleep behavior disorder. This recommendation will be deferred to the patient's neurologist or PCP.    The patient may benefit from establishing care with our psychologist, Yaritza Bowen PsyD, LP ([facility]; [phone]). Dr. Bowen specializes in working with older adults with cognitive issues as well as their loved ones. If the patient is amenable, I will have our schedulers call her to set up an intake appointment.    Consider an adjustment to the patient's psychiatric medication to help better manage her depression/anxiety.    Because of her executive dysfunction, occasional oversight with complex daily activities would be appropriate, particularly with regard to  cooking, medication management, and financial management.  The patient stopped driving after her stroke, which remains appropriate (at least for now) in light of her visuospatial impairment, executive dysfunction, and significant variability in attention and processing speed.    Consider a referral to Occupational Therapy for a CPT assessment, which may help clarify her functional capabilities and clarify the level of support needed at home.  Occupational Therapy could also help the patient compensate for her visuospatial difficulties and other cognitive deficits.    Ms. Brown reported taking a sleep aid nightly that contains doxylamine, which has anticholinergic properties  that can affect memory. She is encouraged to discontinue this medication and re-try melatonin, or talk with her doctor about a prescription for sleep initiation if needed.    The patient is encouraged to utilize cognitive compensatory strategies in daily life, including utilizing note pads, checklists, to-do lists, a calendar/planner, labeled alarm reminders, a GPS, a pillbox, and maintaining a daily morning and nighttime routine and an organized living/work environment. Performing important/complex tasks or having important conversations should be done in a quiet, distraction-free environment (this includes putting away the cell phone, turning off the TV or music in the background, etc.).     Ms. Brown is strongly encouraged to adhere closely to her medication regimen to help keep her cerebrovascular risk factors (e.g., hypertension, hyperlipidemia, etc.) well controlled. This may help to prevent further cognitive decline in the future.    Ms. Brown is encouraged to remain physically, socially, and mentally active in order to optimize her brain health.    Neuropsychological follow-up is recommended in 12-18 months (or as clinically indicated) in order to monitor her cognitive status, help to clarify etiology, and update recommendations. The current test data can be used as a baseline to which future comparisons can be made.        Thank you for allowing me to participate in Ms. Brown's care. Please contact me with any questions regarding the content of this report.        Dinah Grajeda PsyD,   Clinical Neuropsychologist  [facility] [address], [address]  Phone: [phone]  Fax: [phone]          --------------------------------EXTENDED REPORT--------------------------------  The following information was obtained via medical record review and by interview of the patient and her , Jeremiah.    HISTORY OF PRESENTING PROBLEM:  Medical records, Ms. Brown  experienced an acute episode of dizziness and word finding difficulty, for which she was evaluated in the [facility] ED on 7/8/2024.  Brain MRI/MRA at that time was negative for any acute intracranial abnormalities.  Her symptoms improved in the ED, so she was discharged home with meclizine.  Her dizziness persisted and became much worse on 9/16/2024, so she presented to the urgency room.  Head CT was again unremarkable.  She was instructed to follow-up with her PCP.  At some point shortly thereafter, the patient began developing visual hallucinations and vision issues.  Her PCP referred her to ophthalmology, whose examination revealed left homonymous hemianopsia.  She was subsequently referred to neurology.  She also began working with providers at the [facility] to help manage her dizziness and vision issues.    Ms. Brown established care with neurologist, Ankur Sun MD, on 1/10/2025.  During that visit, the patient reported ongoing visual hallucinations, ongoing dizziness, and other cognitive concerns.  He scored 18/30 on a brief cognitive screen.  Her neurologic examination otherwise revealed ongoing left homonymous hemianopsia.  Per Dr. Sun's assessment:     Alivia Brown is a pleasant 84 year old female who presents in consultation.  She has had a rather abrupt left sided homonymous hemianopsia and issues with dizziness.  Some of these may have occurred after her initial MRI/MRA, reviewed together and unrevealing (this seemed to have been obtained 2/2 dizziness but visual changes have progressed subsequently).  Discussed her brain did not have alzheimers features (hippocampi looked good) or PCA features (Alzheimers variant).  Given her visuospatial limitations I have suspicion for blossoming lewy body spectrum phenomena but this is confounded by any possible new stroke (need to update MRI to exclude R occipital lobe stroke). Discussed aricept risk/benefit, designed for  "limited Alzheimers progression but has lewy body data.    Dr. Sun referred the patient for an updated brain MRI and this neuropsychological evaluation, and started her on a trial of Aricept.  Updated brain MRI dated 1/22/2025 revealed evidence of a prior right occipital lobe infarct (as clinically suspected) superimposed upon chronic microvascular ischemic disease.  The patient eventually discontinued Aricept due to unwanted side effects.    CURRENT CLINICAL INTERVIEW:  Today, Ms. Brown reported experiencing ongoing vision issues, noting that her vision is blurred.  She stated, \"they say my vision is normal, but I know something is wrong.\"  Her  (Jeremiah) noted that she is sometimes unable to see things that are right in front of her ever since the stroke.  The patient agreed with this, noting that if the object is clear (e.g., a glass cup on the table) or blends in with its surroundings, she is unlikely to see it.  She also reported that she is bumping into things more often.  At the same time, Jeremiah noted that she is able to do word searches and crossword puzzles without any difficulty seeing those things.  Along with these vision/visual spatial issues, the patient reported that she is \"a little concerned\" about her memory.  She finds that she loses things more often.  However, she denied forgetting recent conversations or events, and is not getting lost in familiar places.  Upon further questioning, she endorses feeling mentally slower since the stroke.  Jeremiah agreed.  She also had significant word finding difficulty around the time of the stroke, but this has gotten much better over time.  The patient and her  both reported that they had no concerns about her cognitive functioning prior to the acute dizziness spell in July 2024.    Ms. Brown continues to experience visual hallucinations, but much less frequently now.  These began following the onset of her dizziness last summer/fall.  She would " primarily see a lady in a bonnet with a small child, and then she started seeing animals.  She would see things fairly vividly, both at night and during the day.  They did not bother her.  Jeremiah agreed that her visual hallucinations seem less frequent, or at least she is mentioning them less frequently as time has gone on.    With regard to the activities of daily living, Ms. Brown reported that she is fully independent.  She currently lives with her  in a single-level home, where they have resided since 1997.  She stopped driving after the stroke because of her vision symptoms.  Prior to the stroke, she had no issues with driving.  Jeremiah agreed.  She manages her medications independently with the use of a pillbox, and remember to take them on her own without any difficulty.  Jeremiah has always managed the bills and finances.  The patient continues to cook, perform household chores and errands, and utilize familiar appliances and devices without issue.    MEDICAL HISTORY:  Long with the previously described right occipital stroke, occasional dizziness, and visual hallucinations, Ms. Brown's medical history is additionally significant for the following:   Patient Active Problem List   Diagnosis    Anxiety state    Benign essential hypertension    Chronic pain    Dry eyes    Hearing loss    Hyperlipidemia    Osteoporosis    Recurrent major depression    Pelvic fracture (H)     The patient reported that her dizziness has improved significantly, although she still has episodes of dizziness that can come out of the blue or with positional change.  Her balance has improved after working with providers at [facility].  She denied having any recent falls.    Although the patient did not mention having a tremor during the clinical interview, a mild tremor was observed in her dominant (right) hand during pencil-and-paper tests during today's assessment.    The patient started breaking out in a rash a couple of weeks ago.  It  does not itch or bother her, aside from having an unwanted appearance.  She was told by provider that she is probably allergic to something.  She has a follow-up for this later this week.  She also noted that she has aged spots on her skin that have a hair follicle in them.  She has been picking at the hair follicle more often, to the point where she might bleed and form scabs.    With regard to pain, the patient reported that she occasionally has problems with her knees.  Other pain was denied.  She feels fairly comfortable today.    The patient reported that her sleep is normal and largely undisturbed. She estimates that she is typically getting 12 hours of sleep per night and reported that she feels well rested in the morning.  Nocturia was denied (there are only some nights when she has to get up once to use the bathroom).  She takes melatonin every night.  Her  noted that they sleep in separate bedrooms because she snores and acts out her dreams on occasion.  He has not witnessed any clear apneic spells.  She has never had a sleep study.    The patient denied any history of significant head injuries, known seizure, or microsmia/anosmia.  She wears bilateral hearing aids, which are effective in correcting her for her hearing loss.    Past Surgical History:   Procedure Laterality Date    WRIST SURGERY Left     WRIST SURGERY Left      Diagnostic studies:  Most recent brain MRI dated 1/22/2025 revealed:  FINDINGS:  INTRACRANIAL CONTENTS: Right inferior occipital region of encephalomalacia/gliosis (axial series 6, images 11-17). No corresponding abnormal diffusion signal, signal loss on susceptibility-weighted images or corresponding postcontrast enhancement. Finding is new since comparison MRI [dated 7/8/2024]. No acute or subacute infarct. No mass, acute hemorrhage, or extra-axial fluid collections. Scattered nonspecific T2/FLAIR hyperintensities within the cerebral white matter most consistent with mild  "chronic microvascular ischemic change. Mild to moderate generalized cerebral atrophy. No hydrocephalus. Normal position of the cerebellar tonsils.    Current medications include (per medical record):   Current Outpatient Medications:     acetaminophen (TYLENOL) 325 MG tablet, Take 2 tablets (650 mg) by mouth every 4 hours as needed for mild pain, Disp: , Rfl:     alendronate (FOSAMAX) 70 MG tablet, Take 70 mg by mouth every 7 days, Disp: , Rfl:     Ascorbic Acid 500 MG CAPS, Take 1 capsule by mouth daily., Disp: , Rfl:     atorvastatin (LIPITOR) 10 MG tablet, Take 10 mg by mouth daily, Disp: , Rfl:     CALCIUM PO, Take by mouth daily, Disp: , Rfl:     donepezil (ARICEPT) 5 MG tablet, Take 1 tablet (5 mg) by mouth at bedtime., Disp: 90 tablet, Rfl: 1    hydroCHLOROthiazide 12.5 MG tablet, 1 tablet in the morning Orally Once a day for 90 days, Disp: , Rfl:     meclizine (ANTIVERT) 25 MG tablet, Take 1 tablet (25 mg) by mouth 3 times daily as needed for dizziness, Disp: 20 tablet, Rfl: 0    PARoxetine (PAXIL) 20 MG tablet, Take 20 mg by mouth every morning, Disp: , Rfl:     triamterene-HCTZ (DYAZIDE) 37.5-25 MG capsule, Take 1 capsule by mouth every morning, Disp: , Rfl:     VITAMIN D, CHOLECALCIFEROL, PO, Take by mouth daily, Disp: , Rfl:     RELEVANT FAMILY MEDICAL HISTORY:   There is no known neurologic family history.      PSYCHIATRIC HISTORY:  With regard to her psychiatric history, medical records state that Ms. Brown has a history of recurrent major depressive disorder and anxiety.  There is no history of psychiatric hospitalization or suicide attempt.  Currently, the patient described her mood as \"a little depressed now and then,\" which she largely attributed to not being as active or as social in the winter.  Her recent symptoms and numerous appointments/evaluations have been fairly stressful for her.  She denied experiencing any significant anxiety.  Jeremiah denied having any significant concerns about her " "emotional wellbeing at this time.  He has also not noticed any significant personality change.    With regard to substance use, Ms. Brown is a former smoker but quit in her 50s.  Other historical substance abuse was denied.  Currently, she might have one or maybe two vodka tonics in a sitting, but not every day. Other current substance use was denied.    SOCIAL HISTORY:  Ms. Brown was born and raised in a [facility] community near [place], [place].  She attended school in a one room country school house, where a single teacher taught all grade levels.  The patient recalled struggling to learn how to read and write.  That said, she also noted that her father was an alcoholic, and her mother was having a difficult time raising the seven children largely on her own.  The patient earned mostly C's throughout her schooling.  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  She retired from [employer]. She has been  for 60 years, and they have 3 children together along with seven grandchildren. The patient and her  live together in their own home in [place], [place].    BEHAVIORAL OBSERVATIONS:   Ms. Brown arrived on time and accompanied by her   to today's appointment. She was appropriately dressed and groomed. She appeared alert and engaged. She ambulated independently but did bump into furniture a couple of times when settling into her chair in my office. She had a tendency to pick at the skin on her hands. Very occasional hearing difficulties were observed despite use of bilateral hearing aids. Conversational speech was of normal rate, volume, and prosody. Occasional word-finding pauses and paraphasic errors were noted (e.g., she called the eye doctor a \"dentist\" on 3 different occasions). Her thought process appeared linear and goal-directed. No " hallucinations or delusions were apparent today. Judgment and insight appeared intact. Her mood was euthymic and her affect was appropriately reactive. Rapport was easily established and eye contact was appropriate.     During the testing session, Ms. Brown was alert throughout but became slightly fatigued as the testing session progressed. She was pleasant, jocular, and cooperative throughout the evaluation.  She exhibited a slight tremor in her hand during pencil-and-paper tasks.  She seemed to be easily distracted when the examiner was relaying test instructions to her, and often needed the instructions to be repeated.  There were also some times when she seemed to have difficulty hearing the test instructions, but her hearing issues did not seem to negatively impact her performance during tasks.  There were also times when she seemed to forget task demands partway through the test.  She exhibited some occasional word finding difficulties and occasionally long delays before responding.  Aside from some distractibility and ongoing tendency to pick at her hands, no other frontal signs were observed behaviorally. Ms. Brown appeared adequately motivated and engaged easily during testing. Overall, the following results are considered a reasonably valid estimation of her current cognitive abilities.    TESTS ADMINISTERED:   Wechsler Memory Scale-III (WMS-III) select subtests, Wechsler Adult Intelligence Scale-IV (WAIS-IV) select subtests, Wide Range Achievement Test-5 (WRAT-5) select subtests, Wechsler Memory Scale-IV (WMS-IV) select subtests, [place] Verbal Learning Test-3 Short Form (CVLT-3 Short), Oral Trail Making Test, Denia Edwards Executive Functioning System (DKEFS) select subtests, Stroop, Controlled Oral Word Association Test (COWAT) and Category Fluency, Galloway Naming Test-2 (BNT-2), Sridhar-Osterrieth Complex Figure Test (RCFT) and Clock Drawing Test, Line Bisection test, Generalized Anxiety Disorder-7  Assessment (RAMÍREZ-7) and Geriatric Depression Scale-Short Form (GDS-15).    MOANS norms were used for BNT, Trail Making Test A & B, COWAT & Category Fluency, Stroop, Sridhar-O Copy   Corwin Keys, & Tad (2010) norms used for Oral Trail Making test    DESCRIPTIVE PERFORMANCE KEY:    Labels for tests with Normal Distributions  Score Label Standard Score %ile Rank   Exceptionally high score  > 130 > 98   Above average score 120-129 91-97   High average score 110-119 75-90   Average score  25-74   Low average score 80-89 9-24   Below average score 70-79 2-8   Exceptionally low score < 70 < 2     Labels for tests with Non-Normal Distributions  Score Label %ile Rank   Within normal expectations/ limits score (WNL) > 24   Low average score 9-24   Below average score 2-8   Exceptionally low score < 2     The following test results utilize score labels as adapted from Elvin Sr, Jasmeet Patel, Yudith Crespo, JESSICA Leija, Efren Georges Michael Westerveld & Conference Participatnts (2020): American Academy of Clinical Neuropsychology consensus conference statement on uniform labeling of performance test scores, The Clinical Neuropsychologist, DOI: 10.1080/[email or web address]. All scores contain some measure of error; scores are reported here as they are obtained by the individual (without reference to the range of error). These are meant as labels and not interpretation of performance. While other relevant comments regarding task performance are provided below, please see the Summary, Impressions, and Diagnosis sections of this report for interpretation of the scores and the cognitive profile as a whole, including what does and does not constitute impairment for this particular individual.    OPTIMAL PREMORBID INTELLECT:  Optimal premorbid intellectual abilities were estimated as falling in the average range based on Ms. Brown's educational and occupational histories and  "performance on tasks least likely to be affected by acquired brain dysfunction.    SUMMARY OF TEST RESULTS:  ORIENTATION. Performance on a mental status exam, assessing orientation to personal and current information, resulted in a low average score. She was oriented to personal information, place, time, and most of the date (although she stated it was \"May\" instead of April) and was able to correctly name the current and previous presidents.  She also noted that the day of the week was \"Wednesday\" instead of Monday.    ATTENTION/WORKING MEMORY. The patient's score on a measure sensitive to sustained auditory-verbal attention and concentration (WAIS-IV Digit Span) was classified as below average, as she was able to recite up to 4 digits forward (a low average score), up to 4 digits backward (an average score), and up to 2 digits in sequence (a below average score).  On the DKEFS Trail Making Test, visual scanning was average completion time and with 0 omission errors.     PROCESSING SPEED. Speeded digit-symbol coding was average (WAIS-IV Coding).  On the DKEFS Trail Making Test, simple motor speed was exceptionally low, as was speeded number sequencing and speeded letter sequencing. On a test of speeded numeric sequencing that does not require use of a pencil and paper (Oral Trails A), the patient's score was exceptionally low for completion time and with 1 error. On the Stroop test, speeded color naming was low average, and speeded word reading was average.     LANGUAGE PROCESSING. Language comprehension appeared intact. Confrontation naming was measured as an average score (47/60; BNT-2). The patient's performance on a test of phonemic fluency resulted in an average score (COWAT), and her semantic fluency was below average (Category Fluency). Her writing sample was intact and there was no evidence of micrographia.     VISUOSPATIAL/CONSTRUCTIONAL SKILLS. The patient's ability to bisect lines on the page was notable " for a slight shift to the right on the majority of items (Line Bisection test). Vvisuoconstruction with three-dimensional blocks was average (WAIS-IV Block Design). Copy of a complex geometric figure was notably impaired due to a piecemeal approach, distortion of the figures gestalt, omission and misplacement of several detailed elements, perseveration of some details, and general imprecision (RCFT Copy). Copy of simple geometric figures was also below expectation (WMS-IV Visual Reproduction Copy). On a Clock Drawing Task, the patient was able to draw a large, well-formed [place] with equally spaced and correctly placed numbers. Her clock hands converged nicely in the center of the clock face and were well differentiated by length.      LEARNING/MEMORY. On the WMS-IV Logical Memory subtest, immediate memory for two paragraph-length stories resulted in a low average score. After a 20-minute delay, the patient's score on the delayed recall trial was average with a 55% retention rate. On the recognition trial, the patient's score was classified as average.    On a 9-item verbal list-learning task (CVLT-3 Short Form), the patient acquired up to 3 words of the word list by the 4th and final learning trial (raw scores over trials = 3, 3, 3, 3). Total learning acquisition was exceptionally low. Following a brief, 30-second distractor task, the patient recalled 4 items, which indicated a retention rate of 133%. After a 10-minute delay, the patient recalled 4 items, which was low average and again with a 133% retention rate. Her recall did not benefit from semantic cues (4 items; below average). Recognition discriminability was below average, as she recognized 7/9 items but also made 6 false-positive errors.     On a visual memory measure (WMS-IV Visual Reproduction), immediate recall of simple geometric figures was low average, while delayed recall of the figures was below average (with a 0% retention rate). Delayed  "recognition of the figures was average.     EXECUTIVE FUNCTIONS. On a test of inhibition and cognitive flexibility (Stroop), the patient's score was below average for completion time and made 3 errors. On a test that requires speeded alpha-numeric sequencing/cognitive set-shifting with pencil and paper (DKEFS [place] Making Test), the task had to be discontinued at the maximum time limit (the patient reached item \"9\" after 6 minutes and had made three errors, which is average).  On a similar test of alpha-numeric sequencing/cognitive set-shifting that does NOT require use of the pencil and paper, the patient score was exceptionally low and with two errors (Oral Trails Part Two).  Phonemic fluency was average (COWAT). On the Clock Drawing Test, the patient was unable to accurately represent analog time.    MOOD. On the GDS-15 a self report measure of depressive symptomatology, she obtained a score of 8, placing her in the range of moderate depressive symptoms. On the RAMÍREZ-7, a self-report measure of anxiety, she obtained a score of 5,  placing her in the range of mild anxiety.    ____________________________________________________________________________________    SERVICES PROVIDED & TIME:  On-site Office Visit Details   Verbal consent for neuropsychological testing was received following the provision of information about the nature and purpose of the evaluation, and the opportunity to ask questions. Verbal permission to route a copy of the final report to her primary care provider was also obtained.  A clinical interview/neurobehavioral status examination was conducted with the patient and documented. I thoroughly reviewed the medical record, selected the neuropsychological test battery, provided supervision to the trained examiner/technician, interpreted/integrated patient data and test results, and engaged in clinical decision making, treatment planning, report writing/preparation, and provision of interactive " feedback of test results on 4/21/2025 . A trained examiner/technician administered and scored the neuropsychological tests (2+ tests).  Please see below for a breakdown of time spent and the associated codes billed for these services.  Services   Time Spent  CPT Codes   Neurobehavioral Status Exam:  (e.g., clinical interview and neurobehavioral status exam, interpretation, report)   84 minutes   1 x 96116     Neuropsychological Evaluation Services:   (e.g., integration, interpretation, treatment planning, clinical decision making, feedback of test results)   225 minutes   1 x 96132  3 x 96133   Neuropsychological Testing by Trained Examiner/Technician:  (e.g., test administration, scoring, 2+ tests administered)   140 minutes   1 x 96138  4 x 96139   For diagnostic and coding purposes, Ms. Brown has a history of hypertension, hyperlipidemia, major depressive disorder, anxiety, and recent history of right occipital stroke in the Fall of 2024. 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 Please note, all charges are filed at the completion of the Episode of Care and associated with the final encounter date (feedback session on 4/21/2025).

## 2025-04-21 ENCOUNTER — OFFICE VISIT (OUTPATIENT)
Dept: NEUROLOGY | Facility: CLINIC | Age: 85
End: 2025-04-21
Payer: MEDICARE

## 2025-04-21 DIAGNOSIS — H53.47 HEMIANOPSIA: ICD-10-CM

## 2025-04-21 DIAGNOSIS — F06.70 MILD NEUROCOGNITIVE DISORDER DUE TO MULTIPLE ETIOLOGIES: Primary | ICD-10-CM

## 2025-04-21 DIAGNOSIS — I63.9 CEREBROVASCULAR ACCIDENT (CVA), UNSPECIFIED MECHANISM (H): ICD-10-CM

## 2025-04-21 DIAGNOSIS — F41.9 ANXIETY: ICD-10-CM

## 2025-04-21 DIAGNOSIS — F33.1 MODERATE EPISODE OF RECURRENT MAJOR DEPRESSIVE DISORDER (H): ICD-10-CM

## 2025-04-21 NOTE — PROGRESS NOTES
NEUROPSYCHOLOGY FEEDBACK VISIT  [facility]      Visit Summary  The purpose of today s appointment was to provide feedback regarding Ms. Brown's recent neuropsychological consult completed on 4/14/2025. Her  joined us for today's visit. We began the session by discussing her experience during the evaluation. I provided Ms. Brown with detailed feedback regarding her performance on cognitive testing and her pattern of cognitive strengths and weaknesses.  I discussed my overall impressions and recommendations and provided the opportunity for Ms. Brown and her  to ask any questions that they had about the evaluation. At the end of the session, they indicated that they understood the results and that I had answered all of their questions.       Dinah Grajeda PsyD,   Licensed Clinical Neuropsychologist  [address], [address]  [place] [address]  Phone: [phone]        Office Visit Details  Type of service:  Office Visit  Start Time: 11:05 AM  End Time: 12:06 PM  Location:  [facility]    For diagnostic and coding purposes, Ms. 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 As this is the final date for this Episode of Care (initiated on 4/14/2025) all charges for the entire Episode of Care will be filed today. Please see the 4/14/2025 evaluation for a detailed description of codes and services, including services provided today.      In brief:   1 x 96116  1 x 96132  3 x 96133  1 x 96138  4 x 96139

## 2025-04-21 NOTE — LETTER
4/21/2025      Alivia Brown  [address] [place] [address]      Dear Colleague,    Thank you for referring your patient, Alivia Brown, to the [facility] NEUROLOGY [facility]. Please see a copy of my visit note below.    NEUROPSYCHOLOGY FEEDBACK VISIT  [facility]      Visit Summary  The purpose of today s appointment was to provide feedback regarding Ms. Brown's recent neuropsychological consult completed on 4/14/2025. Her  joined us for today's visit. We began the session by discussing her experience during the evaluation. I provided Ms. Brown with detailed feedback regarding her performance on cognitive testing and her pattern of cognitive strengths and weaknesses.  I discussed my overall impressions and recommendations and provided the opportunity for Ms. Brown and her  to ask any questions that they had about the evaluation. At the end of the session, they indicated that they understood the results and that I had answered all of their questions.       Dinah Grajeda PsyD, LP  Licensed Clinical Neuropsychologist  [address], [address]  [place] [address]  Phone: [phone]        Office Visit Details  Type of service:  Office Visit  Start Time: 11:05 AM  End Time: 12:06 PM  Location:  [facility]    For diagnostic and coding purposes, Ms. 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 As this is the final date for this Episode of Care (initiated on 4/14/2025) all charges for the entire Episode of Care will be filed today. Please see the 4/14/2025 evaluation for a detailed description of codes and services, including services provided today.      In brief:   1 x 96116  1 x 96132  3 x 96133  1 x 96138  4 x 96139       Again, thank you for allowing me to participate in the care of your patient.         Sincerely,        Dinah Grajeda Psy.D, LP    Electronically signed

## 2025-04-21 NOTE — PATIENT INSTRUCTIONS
SUMMARY OF FINDINGS: (excerpt from full report dated 4/14/2025)  Results of testing indicate that Ms. Brown is of estimated average premorbid intellectual functioning; however, several of Ms. Brown's performances fall well below that estimate. Specifically, she exhibits mild to severe impairment on measures of executive functioning, including mental flexibility/set shifting, cognitive inhibition, visual planning/organization, and abstract reasoning.  In addition, Ms. Brown's nonverbal (visual) memory retrieval is markedly impaired (despite intact learning of that same material).  Conversely, her verbal learning is gnhliqgzqz-tk-kszytd impaired, but her memory of verbal material is fully intact.  The patient's semantic fluency score is mildly impaired and is significantly lower than her average-range phonemic fluency.  There is significant variability in Ms. Brown's processing speed scores (which range from severely impaired to average), and in her attention/concentration (which ranges from mildly impaired to average).  Lastly, several of the patient's visual-spatial/constructional abilities are also below expectation, including moderately impaired copy of a complex figure, which is notable for significant distortion, a piecemeal approach, omission of several detailed elements, perseveration, and general imprecision.  In addition, her copy of simpler figures is also slightly below expectation in the borderline impaired range.  Her ability to bisect lines on a page reveals a slight shift to the right.    It is important to note that despite the significant visuospatial processing issues noted above, those deficits do not fully explain/account for all of her low test scores.  For example, Ms. Brown performs well on tests of reading, visual scanning (which is quick and without error), and naming visual stimuli.  Furthermore, there are several tasks that are administered via auditory-verbal methods (with no visual  stimuli) that were still impaired, including tests of attention/concentration, some tests of processing speed, some tests of executive functioning, and verbal learning.    With regard to learning/memory more specifically, Ms. Brown's learning of nonverbal information is within expectation, but she is unable to recall any of that information over time (0% retention rate).  However, her recall significantly benefits from prompts/cues on the recognition trial, on which her score improves back up to the average range.  This performance suggests a significant retrieval deficit.  In contrast, the patient's verbal learning is slightly below expectation, falling in the piewqltdgn-na-ukjfmm impaired range.  However, her memory of that same information is fully intact.    All other cognitive test performances are within normal limits, including those on all other measures of language processing (e.g., confrontation naming, single-word reading), visuoconstruction using three-dimensional blocks, phonemic fluency, and (as noted above) visual scanning, nonverbal learning efficiency, and verbal memory.    Emotionally, the patient endorses moderate depressive symptoms and mild anxiety on self-report questionnaires.  She acknowledges that she typically becomes more depressed in the winter when there is less to do and she stays home more often.  She prefers to be more social.  Her  denied having any significant concerns about the patient's emotional wellbeing.  He has also not observed any significant personality change.    IMPRESSIONS:  Overall, these results are suggestive of mild-to-moderate bilateral frontal/subcortical dysfunction.  Although her visuospatial/constructional skills are below expectation and could plicate the nondominant parietal lobe, these abilities might also be affected by her fairly recent right occipital lobe stroke.  Despite these current cognitive difficulties, Ms. Brown remains fairly independent  in daily life.  As such, she currently meets criteria for Mild Neurocognitive Disorder.  Etiology is somewhat uncertain, as this type of cognitive profile is fairly nonspecific.  That said, I suspect that there may be a combination of neurologic and possibly non-neurologic factors at play, including:  Ongoing sequelae from her right occipital stroke, which could contribute to some of her visuospatial/constructional deficits and possibly her visual hallucinations that have been occurring less frequently over time.  However, this stroke would not fully explain all of her cognitive impairments. If the stroke is contributing to some of her visuoconstructional deficits and hallucinations, then she may continue to experience ongoing improvement in those symptoms over time.  Recovery from stroke typically starts to plateau after about a year.  Other cerebrovascular disease (chronic small vessel ischemic changes) could also potentially account for some of her frontal/subcortical dysfunction (her 2024 MRI noted a mild-to-moderate degree of chronic small vessel ischemia, although her 2025 MRI only categorized as mild).  Possible RAMIREZ/sleep-disordered breathing, as reported by her  who sleeps in a separate room.  If the patient has untreated sleep apnea, this could contribute to frontal/subcortical deficits, visuoconstructional impairment, and even active sleeping.  Treatment of RAMIREZ (if present) could elicit some degree of improvement in these symptoms over time.  Moderately depressed mood and anxiety may exacerbate her cognitive difficulties to some degree, and can contribute to variability in her cognitive performance, both on testing and in daily life.  Improvements in her mood could also lead to some improvements in her cognitive functioning.  The patient also reports a history of possible verbal learning disability, as she always struggled with learning how to read and write.  This could potentially explain some of  her verbal learning inefficiencies on testing.  However, assessing for learning disabilities is outside of the scope of this evaluation.  Although Ms. Brown has some symptoms that are consistent with a Lewy body dementia (LBD) process (e.g., frontal/subcortical dysfunction, visuospatial/constructional impairments, visual hallucinations, tremor, and possible REM sleep behavior disorder reported by her ) these symptoms are all fairly nonspecific and could be fully explained by the other factors mentioned above.  In addition, many of the clinical characteristics began acutely, presumably at the time of her stroke, which might argue against an emerging LBD process (which typically onsets gradually). That said, ongoing monitoring would be appropriate.  At this time, there is no compelling evidence of an Alzheimer's disease process.  Her profile is also not strongly supportive of posterior cortical atrophy (and again, her recent occipital stroke could fully account for some of those symptoms she is demonstrating).    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     If not medically contraindicated, Ms. Brown may benefit from a trial of a medication for her cognitive impairment (e.g., rivastigmine, since donepezil was not well tolerated).  Some studies suggest that rivastigmine has shown potential benefits in folks with vascular cognitive impairment and in LBD (although it is less likely that the patient has LBD at this time).  This recommendation will be deferred to her neurologist.    Consider a referral to Sleep Medicine in order to evaluate for sleep disordered breathing/RAMIREZ and for possible REM sleep behavior disorder. This recommendation will be deferred to the patient's neurologist or  PCP.    The patient may benefit from establishing care with our psychologist, Yaritza Bowen PsyD, LP ([facility]; [phone]). Dr. Bowen specializes in working with older adults with cognitive issues as well as their loved ones. If the patient is amenable, I will have our schedulers call her to set up an intake appointment.    Consider an adjustment to the patient's psychiatric medication to help better manage her depression/anxiety.    Because of her executive dysfunction, occasional oversight with complex daily activities would be appropriate, particularly with regard to cooking, medication management, and financial management.  The patient stopped driving after her stroke, which remains appropriate (at least for now) in light of her visuospatial impairment, executive dysfunction, and significant variability in attention and processing speed.    Consider a referral to Occupational Therapy for a CPT assessment, which may help clarify her functional capabilities and clarify the level of support needed at home.  Occupational Therapy could also help the patient compensate for her visuospatial difficulties and other cognitive deficits.    The patient is encouraged to utilize cognitive compensatory strategies in daily life, including utilizing note pads, checklists, to-do lists, a calendar/planner, labeled alarm reminders, a GPS, a pillbox, and maintaining a daily morning and nighttime routine and an organized living/work environment. Performing important/complex tasks or having important conversations should be done in a quiet, distraction-free environment (this includes putting away the cell phone, turning off the TV or music in the background, etc.).     Ms. Brown is strongly encouraged to adhere closely to her medication regimen to help keep her cerebrovascular risk factors (e.g., hypertension, hyperlipidemia, etc.) well controlled. This may help to prevent further cognitive decline in  the future.    Ms. Brown is encouraged to remain physically, socially, and mentally active in order to optimize her brain health.    Neuropsychological follow-up is recommended in 12-18 months (or as clinically indicated) in order to monitor her cognitive status, help to clarify etiology, and update recommendations. The current test data can be used as a baseline to which future comparisons can be made.        Thank you for allowing me to participate in your care. Please contact me with any questions regarding the content of this report.        Dinah Grajeda PsyD,   Clinical Neuropsychologist  [facility] [address], [address]  Phone: [phone]  Fax: [phone]      Cognitive Strategies  Take notes! Keep a small notepad with you in your purse/pocket/bag and write things down that you want to remember. You might also keep a notepad in a convenient location in your home (e.g., next to your telephone or calendar). Taking your notes in a note pad (instead of on multiple post-it notes) might help you stay organized, and you will also remember where to go to look for the notes that you took.  Create checklists, grocery lists, and to-do lists. Cross things off as you finish them.  Use a calendar or planner and get in the habit of checking it daily. Make it a part of your morning and/or nighttime routine to remind yourself of upcoming events, activities, or appointments. Cross the days off as they pass so that you can quickly glance at the calendar to know what day it is and what you have going on.  Set alarm reminders. For example, you can set an alarm to remind you to take your medications, turn off the oven, turn off the sprinkler outside, or to start getting ready for your appointment. If you use a smart phone, often times you can label the alarm that goes off so that you know what the alarm is for when it chimes.  Use a pillbox to follow your medication regimen. A  pillbox acts as a nice visual cue to remind us to take our medications. If you have trouble remembering whether or not you have already taken your medications today, a pillbox can be extremely helpful to help with this issue.  Use a GPS when driving to help you stay on route.  When having an important conversation or completing an important task, reduce as many distractions in the environment as you can. For example, turn off the TV or the music in the background. Turn off or silence your cell phone. Clear your work area of everything that you do not need for the task at hand.  Establish set locations for certain items. For example, if you keep your keys on a hook by your door, you will always know where to go to look for them.   Maintain a daily routine, especially for morning and nighttime tasks.

## 2025-05-12 ENCOUNTER — LAB REQUISITION (OUTPATIENT)
Dept: LAB | Facility: CLINIC | Age: 85
End: 2025-05-12
Payer: MEDICARE

## 2025-05-12 DIAGNOSIS — I10 ESSENTIAL (PRIMARY) HYPERTENSION: ICD-10-CM

## 2025-05-12 DIAGNOSIS — E55.9 VITAMIN D DEFICIENCY, UNSPECIFIED: ICD-10-CM

## 2025-05-12 DIAGNOSIS — E53.8 DEFICIENCY OF OTHER SPECIFIED B GROUP VITAMINS: ICD-10-CM

## 2025-05-12 LAB
ALBUMIN SERPL BCG-MCNC: 3.9 G/DL (ref 3.5–5.2)
ALP SERPL-CCNC: 98 U/L (ref 40–150)
ALT SERPL W P-5'-P-CCNC: 10 U/L (ref 0–50)
ANION GAP SERPL CALCULATED.3IONS-SCNC: 12 MMOL/L (ref 7–15)
AST SERPL W P-5'-P-CCNC: 19 U/L (ref 0–45)
BILIRUB SERPL-MCNC: 0.4 MG/DL
BUN SERPL-MCNC: 10 MG/DL (ref 8–23)
CALCIUM SERPL-MCNC: 8.8 MG/DL (ref 8.8–10.4)
CHLORIDE SERPL-SCNC: 106 MMOL/L (ref 98–107)
CREAT SERPL-MCNC: 0.7 MG/DL (ref 0.51–0.95)
EGFRCR SERPLBLD CKD-EPI 2021: 84 ML/MIN/1.73M2
GLUCOSE SERPL-MCNC: 105 MG/DL (ref 70–99)
HCO3 SERPL-SCNC: 23 MMOL/L (ref 22–29)
POTASSIUM SERPL-SCNC: 3.4 MMOL/L (ref 3.4–5.3)
PROT SERPL-MCNC: 6.8 G/DL (ref 6.4–8.3)
SODIUM SERPL-SCNC: 141 MMOL/L (ref 135–145)
VIT B12 SERPL-MCNC: 635 PG/ML (ref 232–1245)
VIT D+METAB SERPL-MCNC: 53 NG/ML (ref 20–50)

## 2025-05-12 PROCEDURE — 80053 COMPREHEN METABOLIC PANEL: CPT | Mod: ORL | Performed by: STUDENT IN AN ORGANIZED HEALTH CARE EDUCATION/TRAINING PROGRAM

## 2025-05-12 PROCEDURE — 82306 VITAMIN D 25 HYDROXY: CPT | Mod: ORL | Performed by: STUDENT IN AN ORGANIZED HEALTH CARE EDUCATION/TRAINING PROGRAM

## 2025-05-12 PROCEDURE — 82607 VITAMIN B-12: CPT | Mod: ORL | Performed by: STUDENT IN AN ORGANIZED HEALTH CARE EDUCATION/TRAINING PROGRAM

## 2025-05-20 ENCOUNTER — OFFICE VISIT (OUTPATIENT)
Dept: NEUROLOGY | Facility: CLINIC | Age: 85
End: 2025-05-20
Payer: MEDICARE

## 2025-05-20 VITALS
WEIGHT: 170.25 LBS | HEART RATE: 71 BPM | BODY MASS INDEX: 27.36 KG/M2 | HEIGHT: 66 IN | OXYGEN SATURATION: 100 % | SYSTOLIC BLOOD PRESSURE: 138 MMHG | DIASTOLIC BLOOD PRESSURE: 83 MMHG

## 2025-05-20 DIAGNOSIS — F06.70 MILD NEUROCOGNITIVE DISORDER DUE TO MULTIPLE ETIOLOGIES: ICD-10-CM

## 2025-05-20 DIAGNOSIS — R06.83 SNORING: Primary | ICD-10-CM

## 2025-05-20 DIAGNOSIS — G47.09 OTHER INSOMNIA: ICD-10-CM

## 2025-05-20 DIAGNOSIS — F41.9 ANXIETY: ICD-10-CM

## 2025-05-20 PROCEDURE — 99215 OFFICE O/P EST HI 40 MIN: CPT | Performed by: STUDENT IN AN ORGANIZED HEALTH CARE EDUCATION/TRAINING PROGRAM

## 2025-05-20 PROCEDURE — 3079F DIAST BP 80-89 MM HG: CPT | Performed by: STUDENT IN AN ORGANIZED HEALTH CARE EDUCATION/TRAINING PROGRAM

## 2025-05-20 PROCEDURE — 3075F SYST BP GE 130 - 139MM HG: CPT | Performed by: STUDENT IN AN ORGANIZED HEALTH CARE EDUCATION/TRAINING PROGRAM

## 2025-05-20 PROCEDURE — G2211 COMPLEX E/M VISIT ADD ON: HCPCS | Performed by: STUDENT IN AN ORGANIZED HEALTH CARE EDUCATION/TRAINING PROGRAM

## 2025-05-20 RX ORDER — RIVASTIGMINE TARTRATE 1.5 MG/1
1.5 CAPSULE ORAL 2 TIMES DAILY
Qty: 60 CAPSULE | Refills: 5 | Status: SHIPPED | OUTPATIENT
Start: 2025-05-20

## 2025-05-20 RX ORDER — MIRTAZAPINE 7.5 MG/1
7.5 TABLET, FILM COATED ORAL AT BEDTIME
Qty: 30 TABLET | Refills: 5 | Status: SHIPPED | OUTPATIENT
Start: 2025-05-20

## 2025-05-20 RX ORDER — ASPIRIN 81 MG/1
81 TABLET, CHEWABLE ORAL DAILY
COMMUNITY

## 2025-05-20 NOTE — PATIENT INSTRUCTIONS
Sleep:  Sleep referral  Continue melatonin if desired  Add remeron (mirtazapine) 7.5 mg at night    Stroke prevention:  Continue aspirin daily   Lipitor     Memory:   In a few weeks after starting the night pill start rivastigmine twice a day        Future ideas:  Alzheimers blood work    CSF studies: alzheimers and lewy body

## 2025-05-20 NOTE — NURSING NOTE
"Alviia Brown is a 84 year old female who presents for:  Chief Complaint   Patient presents with    COGNITIVE ASSESSMENT     Hemianopsia/Cognitive        Initial Vitals:  /83   Pulse 71   Ht 1.676 m (5' 6\")   Wt 77.2 kg (170 lb 4 oz)   SpO2 100%   BMI 27.48 kg/m   Estimated body mass index is 27.48 kg/m  as calculated from the following:    Height as of this encounter: 1.676 m (5' 6\").    Weight as of this encounter: 77.2 kg (170 lb 4 oz).. Body surface area is 1.9 meters squared. BP completed using cuff size: regular    Catalina Rathai   "

## 2025-05-20 NOTE — LETTER
5/20/2025      Alivia Brown  [address] [place] [address]      Dear Colleague,    Thank you for referring your patient, Alivia Brown, to the [facility]. Please see a copy of my visit note below.    [facility]  Section of General Neurology  Return Patient Visit    Alivia Brown MRN# [id number]   Age: 84 year old YOB: 1940            Assessment and Plan:   Assessment:  Alivia Brown is a pleasant 84 year old female who presents in follow up.  New onset visual changes were found to be secondary to stroke.  Other work up in this regard negative. On aspirin and statin.  Memory changes persist, neuropsychological testing reviewed.  Poor sleep, anxiety/depression big variables.  She is interested in working on these.  Aricept--did derive benefit from but had bad cramping.  Will re trial via rivastigmine at lower dose.  Not clearly a neurodegenerative process on basis of all of our data/testing though certainly possible and worth empirically treating in our mutual estimation.  Other future ideas for work up as below.       Plan:  Sleep:  Sleep referral  Continue melatonin if desired  Add remeron (mirtazapine) 7.5 mg at night, also may help mood    Stroke prevention:  Continue aspirin daily   Lipitor at current dose    Memory:   In a few weeks after starting the Remeron start rivastigmine 1.5 mg twice a day      Future ideas:  Alzheimers blood work  CSF studies  Higher on Paxil or changing selective serotonin reuptake inhibitor      Follow up in ~6 months, sooner with any issues questions or changes      Sal Sun MD   of Neurology   [facility]      Interval history:     Repeat MRI showed occipital lobe stroke -reviewed  Zio patch negative     Neuropsych report reviewed  Discussed options:  psychology, OT, sleep referral  Consideration of alzheimers antibodies    Med had helped but had  side effects  Now just seeing puppies but improved    Is taking a baby aspirin daily     Aricept--caused cramping in legs    Did help people going away  Puppy--sees at times, doesn't bother as much     Maybe restless, snoring at night    Sleeping pill  Trazodone  Remeron  Discussed     Might go higher on paxil in future or changing zoloft       A/P at last visit  Alivia Brown is a pleasant 84 year old female who presents in consultation.  She has had a rather abrupt left sided homonymous hemianopsia and issues with dizziness.  Some of these may have occurred after her initial MRI/MRA, reviewed together and unrevealing (this seemed to have been obtained 2/2 dizziness but visual changes have progressed subsequently).  Discussed her brain did not have alzheimers features (hippocampi looked good) or PCA features (Alzheimers variant).  Given her visuospatial limitations I have suspicion for blossoming lewy body spectrum phenomena but this is confounded by any possible new stroke (need to update MRI to exclude R occipital lobe stroke).   Discussed aricept risk/benefit, designed for limited Alzheimers progression but has lewy body data  Discussed seroquel as a future option, black box warning.   Overall we will gather more data for more diagnostic certainty but also start treatment to try to delay progression of worsening memory, treat symptoms.  MRI to further exclude clear cause of homonymous hemianopsia  MRA reviewed, unlikely to have changed profoundly over interim, will not repeat this portion at this time.      Plan:  Repeat MRI brain w/w/o   If still no answer may consider referral to neuro ophthalmology  Trial of aricept 5 mg at bedtime  Will hold off on seroquel to discussion for now  Neuropsychological testing  RTC in ~4 months, to reach out sooner with any issues questions or changes      Past Medical History:     Patient Active Problem List   Diagnosis     Anxiety state     Benign essential hypertension      Chronic pain     Dry eyes     Hearing loss     Hyperlipidemia     Osteoporosis     Recurrent major depression     Pelvic fracture (H)     Past Medical History:   Diagnosis Date     Acquired hallux valgus 06/12/2021     Anxiety state 6/12/2021     Anxiety state 06/12/2021     Benign essential hypertension 6/12/2021     Benign essential hypertension 06/12/2021     Chronic pain 06/12/2021     Contracture of palmar fascia 06/12/2021     Disorder of right patellofemoral joint 06/12/2021     Dry eyes 06/12/2021     Hearing loss 6/12/2021     Hearing loss 06/12/2021     History of infectious disease 12/11/2020     History of recent fall     Mechanical Fall     HLD (hyperlipidemia)      HTN (hypertension)      Hyperlipidemia 6/12/2021     Hyperlipidemia 06/12/2021     Left hip pain      Osteoporosis 6/12/2021     Osteoporosis 06/12/2021     Pain in right knee 06/12/2021     Parotitis 06/12/2021     Pelvic fracture (H) 06/12/2021    LEFT     Recurrent major depression 6/12/2021     Recurrent major depression 06/12/2021     Urge incontinence of urine 06/12/2021        Past Surgical History:     Past Surgical History:   Procedure Laterality Date     WRIST SURGERY Left      WRIST SURGERY Left         Social History:     Social History     Tobacco Use     Smoking status: Never     Smokeless tobacco: Never   Substance Use Topics     Alcohol use: Yes     Alcohol/week: 3.0 standard drinks of alcohol     Comment: Alcoholic Drinks/day: 3 drinks of vodka per week     Drug use: Never        Family History:   No family history on file.     Medications:     Current Outpatient Medications   Medication Sig Dispense Refill     acetaminophen (TYLENOL) 325 MG tablet Take 2 tablets (650 mg) by mouth every 4 hours as needed for mild pain       alendronate (FOSAMAX) 70 MG tablet Take 70 mg by mouth every 7 days       Ascorbic Acid 500 MG CAPS Take 1 capsule by mouth daily.       atorvastatin (LIPITOR) 10 MG tablet Take 10 mg by mouth daily        "CALCIUM PO Take by mouth daily       donepezil (ARICEPT) 5 MG tablet Take 1 tablet (5 mg) by mouth at bedtime. 90 tablet 1     hydroCHLOROthiazide 12.5 MG tablet 1 tablet in the morning Orally Once a day for 90 days       meclizine (ANTIVERT) 25 MG tablet Take 1 tablet (25 mg) by mouth 3 times daily as needed for dizziness 20 tablet 0     PARoxetine (PAXIL) 20 MG tablet Take 20 mg by mouth every morning       triamterene-HCTZ (DYAZIDE) 37.5-25 MG capsule Take 1 capsule by mouth every morning       VITAMIN D, CHOLECALCIFEROL, PO Take by mouth daily       No current facility-administered medications for this visit.        Allergies:   No Known Allergies       Physical Exam:   Vitals: /83   Pulse 71   Ht 1.676 m (5' 6\")   Wt 77.2 kg (170 lb 4 oz)   SpO2 100%   BMI 27.48 kg/m     General Appearance: No apparent distress, well-nourished, well-groomed, pleasant      Mental Status: formal testing not repeated        Cranial Nerves:   II: Visual fields: diminished in L visual field/worse in lower quadrant to finger count  III: Pupils: 3 mm, equal, round, reactive to light   III,IV,VI: Extraocular Movements: intact   V: Facial sensation: intact to light touch  VII: Facial strength: intact without asymmetry        Motor Exam:   5/5 diffusely      Sensory: intact to light touch     Coordination: no dysmetria noted     Reflexes: biceps, triceps, brachioradialis, patellar 2+ and symmetric.         Data: Pertinent prior to visit   Imaging  CT HEAD BRAIN WO    Result Date: 9/16/2024  For Patients: As a result of the 21st Century Cures Act, medical imaging exams and procedure reports are released immediately into your electronic medical record. You may view this report before your referring provider. If you have questions, please contact your health care provider. EXAM: CT HEAD BRAIN WO LOCATION: [facility] DATE: 9/16/2024 INDICATION: Dizziness COMPARISON: None. TECHNIQUE: Routine CT Head without IV contrast. " Multiplanar reformats. Dose reduction techniques were used. FINDINGS: INTRACRANIAL CONTENTS: No intracranial hemorrhage, extraaxial collection, or mass effect. No CT evidence of acute infarct. Mild presumed chronic small vessel ischemic changes. Mild generalized volume loss. No hydrocephalus. VISUALIZED ORBITS/SINUSES/MASTOIDS: No intraorbital abnormality. No paranasal sinus mucosal disease. No middle ear or mastoid effusion. BONES/SOFT TISSUES: No acute abnormality.     1. No CT evidence for acute intracranial process. 2. Brain atrophy and presumed chronic microvascular ischemic changes as above.      EXAM: MR BRAIN W/O and W CONTRAST, MRA BRAIN ([place]) W/O CONTRAST, MRA NECK (CAROTIDS) W/O and W CONTRAST  LOCATION: [facility]  DATE: 7/8/2024     INDICATION: 24 hours of dizziness and word finding difficulty, eval for stroke, etc  COMPARISON: CTA head and neck 9/27/2023  CONTRAST: 10 mL Gadavist  TECHNIQUE:   1) Routine multiplanar multisequence head MRI without and with intravenous contrast.  2) 3D time-of-flight head MRA without intravenous contrast.  3) Neck MRA without and with IV contrast. Stenosis measurements made according to NASCET criteria unless otherwise specified.     FINDINGS:  HEAD MRI:  INTRACRANIAL CONTENTS: No acute or subacute infarct. No mass, acute hemorrhage, or extra-axial fluid collections. Patchy nonspecific T2/FLAIR hyperintensities within the cerebral white matter most consistent with mild to moderate chronic microvascular   ischemic change. Mild generalized cerebral atrophy. No hydrocephalus. Normal position of the cerebellar tonsils. No pathologic contrast enhancement.     SELLA: No abnormality accounting for technique.     OSSEOUS STRUCTURES/SOFT TISSUES: Normal marrow signal. The major intracranial vascular flow voids are maintained.      ORBITS: Prior bilateral cataract surgery. Visualized portions of the orbits are otherwise unremarkable.       SINUSES/MASTOIDS: No paranasal sinus mucosal disease. No middle ear or mastoid effusion.      HEAD MRA:   ANTERIOR CIRCULATION: No stenosis/occlusion, aneurysm, or high flow vascular malformation. Fetal origin of both posterior cerebral arteries from the anterior circulation. 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     POSTERIOR CIRCULATION: No stenosis/occlusion, aneurysm, or high flow vascular malformation. Dominant left vertebral artery supplies the basilar artery with a small right vertebral artery supplying the right posterior inferior cerebellar artery (PICA).      NECK MRA:   RIGHT CAROTID: No measurable stenosis or dissection.     LEFT CAROTID: No measurable stenosis or dissection.     VERTEBRAL ARTERIES: No focal stenosis or dissection. Dominant left and smaller right vertebral arteries.     AORTIC ARCH: Classic aortic arch anatomy with no significant stenosis at the origin of the great vessels.                                                                      IMPRESSION:  HEAD MRI:  1.  No recent infarct, intracranial mass or evidence of intracranial hemorrhage.  2.  Mild to moderate presumed chronic small vessel ischemic changes.     HEAD MRA:  1.  No aneurysm, high flow AVM or significant stenosis identified.  2.  Variant [place] of [place] anatomy as above.     NECK MRA:  Normal neck MRA.            Newer data:  EXAM: MR BRAIN W/O and W CONTRAST  LOCATION: [facility]  DATE: 1/22/2025     INDICATION: ? of R occipital lobe stroke to explain L HH  COMPARISON: 7/8/2024.  CONTRAST: 7.5 mL Gadavist  TECHNIQUE: Routine multiplanar multisequence head MRI without and with intravenous contrast.     FINDINGS:  INTRACRANIAL CONTENTS: Right inferior occipital region of encephalomalacia/gliosis (axial series 6, images 11-17). No corresponding abnormal diffusion signal, signal loss on susceptibility-weighted images or corresponding postcontrast enhancement.    Finding is new since comparison MRI. No acute or subacute infarct. No mass, acute hemorrhage, or extra-axial fluid collections. Scattered nonspecific T2/FLAIR hyperintensities within the cerebral white matter most consistent with mild chronic   microvascular ischemic change. Mild to moderate generalized cerebral atrophy. No hydrocephalus. Normal position of the cerebellar tonsils.     SELLA: No abnormality accounting for technique.     OSSEOUS STRUCTURES/SOFT TISSUES: Normal marrow signal. The major intracranial vascular flow voids are maintained.      ORBITS: No abnormality accounting for technique.      SINUSES/MASTOIDS: No paranasal sinus mucosal disease. No middle ear or mastoid effusion.                                                                       IMPRESSION:  1.  Evidence of prior right occipital lobe infarct, as clinically suspected, on a background of presumed sequela chronic microvascular ischemic disease.  2.  Negative for acute intracranial hemorrhage, infarct, hydrocephalus or mass. No pathologic postcontrast enhancement.         Neuropsych data:         SUMMARY OF FINDINGS: (please refer to Extended Report below for full details and comprehensive clinical history)  Results of testing indicate that Ms. Brown is of estimated average premorbid intellectual functioning; however, several of Ms. Brown's performances fall well below that estimate. Specifically, she exhibits mild to severe impairment on measures of executive functioning, including mental flexibility/set shifting, cognitive inhibition, visual planning/organization, and abstract reasoning.  In addition, Ms. Brown's nonverbal (visual) memory retrieval is markedly impaired (despite intact learning of that same material).  Conversely, her verbal learning is [id number] impaired, but her memory of verbal material is fully intact.  The patient's semantic fluency score is mildly impaired and is significantly lower than her  average-range phonemic fluency.  There is significant variability in Ms. Brown's processing speed scores (which range from severely impaired to average), and in her attention/concentration (which ranges from mildly impaired to average).  Lastly, several of the patient's visual-spatial/constructional abilities are also below expectation, including moderately impaired copy of a complex figure, which is notable for significant distortion, a piecemeal approach, omission of several detailed elements, perseveration, and general imprecision.  In addition, her copy of simpler figures is also slightly below expectation in the borderline impaired range.  Her ability to bisect lines on a page reveals a slight shift to the right.     It is important to note that despite the significant visuospatial processing issues noted above, those deficits do not fully explain/account for all of her low test scores.  For example, Ms. Brown performs well on tests of reading, visual scanning (which is quick and without error), and naming visual stimuli.  Furthermore, there are several tasks that are administered via auditory-verbal methods (with no visual stimuli) that were still impaired, including tests of attention/concentration, some tests of processing speed, some tests of executive functioning, and verbal learning.     With regard to learning/memory more specifically, Ms. Brown's learning of nonverbal information is within expectation, but she is unable to recall any of that information over time (0% retention rate).  However, her recall significantly benefits from prompts/cues on the recognition trial, on which her score improves back up to the average range.  This performance suggests a significant retrieval deficit.  In contrast, the patient's verbal learning is slightly below expectation, falling in the [id number] impaired range.  However, her memory of that same information is fully intact.     All other cognitive test  performances are within normal limits, including those on all other measures of language processing (e.g., confrontation naming, single-word reading), visuoconstruction using three-dimensional blocks, phonemic fluency, and (as noted above) visual scanning, nonverbal learning efficiency, and verbal memory.     Emotionally, the patient endorses moderate depressive symptoms and mild anxiety on self-report questionnaires.  She acknowledges that she typically becomes more depressed in the winter when there is less to do and she stays home more often.  She prefers to be more social.  Her  denied having any significant concerns about the patient's emotional wellbeing.  He has also not observed any significant personality change.     IMPRESSIONS:  Overall, these results are suggestive of mild-to-moderate bilateral frontal/subcortical dysfunction.  Although her visuospatial/constructional skills are below expectation and could plicate the nondominant parietal lobe, these abilities might also be affected by her fairly recent right occipital lobe stroke.  Despite these current cognitive difficulties, Ms. Brown remains fairly independent in daily life.  As such, she currently meets criteria for Mild Neurocognitive Disorder.  Etiology is somewhat uncertain, as this type of cognitive profile is fairly nonspecific.  That said, I suspect that there may be a combination of neurologic and possibly non-neurologic factors at play, including:  Ongoing sequelae from her right occipital stroke, which could contribute to some of her visuospatial/constructional deficits and possibly her visual hallucinations that have been occurring less frequently over time.  However, this stroke would not fully explain all of her cognitive impairments. If the stroke is contributing to some of her visuoconstructional deficits and hallucinations, then she may continue to experience ongoing improvement in those symptoms over time.  Recovery from  stroke typically starts to plateau after about a year.  Other cerebrovascular disease (chronic small vessel ischemic changes) could also potentially account for some of her frontal/subcortical dysfunction (her 2024 MRI noted a mild-to-moderate degree of chronic small vessel ischemia, although her 2025 MRI only categorized as mild).  Possible RAMIREZ/sleep-disordered breathing, as reported by her  who sleeps in a separate room.  If the patient has untreated sleep apnea, this could contribute to frontal/subcortical deficits, visuoconstructional impairment, and even active sleeping.  Treatment of RAMIREZ (if present) could elicit some degree of improvement in these symptoms over time.  Moderately depressed mood and anxiety may exacerbate her cognitive difficulties to some degree, and can contribute to variability in her cognitive performance, both on testing and in daily life.  Improvements in her mood could also lead to some improvements in her cognitive functioning.  The patient also reports a history of possible verbal learning disability, as she always struggled with learning how to read and write.  This could potentially explain some of her verbal learning inefficiencies on testing.  However, assessing for learning disabilities is outside of the scope of this evaluation.  Although Ms. Brown has some symptoms that are consistent with a Lewy body dementia (LBD) process (e.g., frontal/subcortical dysfunction, visuospatial/constructional impairments, visual hallucinations, tremor, and possible REM sleep behavior disorder reported by her ) these symptoms are all fairly nonspecific and could be fully explained by the other factors mentioned above.  In addition, many of the clinical characteristics began acutely, presumably at the time of her stroke, which might argue against an emerging LBD process (which typically onsets gradually). That said, ongoing monitoring would be appropriate.  At this time, there is no  compelling evidence of an Alzheimer's disease process.  Her profile is also not strongly supportive of posterior cortical atrophy (and again, her recent occipital stroke could fully account for some of those symptoms she is demonstrating).     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      If not medically contraindicated, Ms. Brown may benefit from a trial of a medication for her cognitive impairment (e.g., rivastigmine, since donepezil was not well tolerated).  Some studies suggest that rivastigmine has shown potential benefits in folks with vascular cognitive impairment and in LBD (although it is less likely that the patient has LBD at this time).  This recommendation will be deferred to her neurologist.     Consider a referral to Sleep Medicine in order to evaluate for sleep disordered breathing/RAMIREZ and for possible REM sleep behavior disorder. This recommendation will be deferred to the patient's neurologist or PCP.     The patient may benefit from establishing care with our psychologist, Yaritza Bowen PsyD, LP ([facility]; [phone]). Dr. Bowen specializes in working with older adults with cognitive issues as well as their loved ones. If the patient is amenable, I will have our schedulers call her to set up an intake appointment.     Consider an adjustment to the patient's psychiatric medication to help better manage her depression/anxiety.     Because of her executive dysfunction, occasional oversight with complex daily activities would be appropriate, particularly with regard to cooking, medication management, and financial management.  The patient stopped driving after her stroke, which remains appropriate (at least for now) in light of her  visuospatial impairment, executive dysfunction, and significant variability in attention and processing speed.     Consider a referral to Occupational Therapy for a CPT assessment, which may help clarify her functional capabilities and clarify the level of support needed at home.  Occupational Therapy could also help the patient compensate for her visuospatial difficulties and other cognitive deficits.     Ms. Brown reported taking a sleep aid nightly that contains doxylamine, which has anticholinergic properties that can affect memory. She is encouraged to discontinue this medication and re-try melatonin, or talk with her doctor about a prescription for sleep initiation if needed.     The patient is encouraged to utilize cognitive compensatory strategies in daily life, including utilizing note pads, checklists, to-do lists, a calendar/planner, labeled alarm reminders, a GPS, a pillbox, and maintaining a daily morning and nighttime routine and an organized living/work environment. Performing important/complex tasks or having important conversations should be done in a quiet, distraction-free environment (this includes putting away the cell phone, turning off the TV or music in the background, etc.).      Ms. Brown is strongly encouraged to adhere closely to her medication regimen to help keep her cerebrovascular risk factors (e.g., hypertension, hyperlipidemia, etc.) well controlled. This may help to prevent further cognitive decline in the future.     Ms. Brown is encouraged to remain physically, socially, and mentally active in order to optimize her brain health.     Neuropsychological follow-up is recommended in 12-18 months (or as clinically indicated) in order to monitor her cognitive status, help to clarify etiology, and update recommendations. The current test data can be used as a baseline to which future comparisons can be made.           Thank you for allowing me to participate in Ms. Brown's care.  Please contact me with any questions regarding the content of this report.          Dinah Grajeda PsyD, RIO  Clinical Neuropsychologist  [facility] [address], [address]  Phone: [phone]  Fax: [phone]       The total time of this encounter today amounted to 42 minutes. This time included time spent with the patient, prep work, ordering tests, and performing post visit documentation.    The longitudinal plan of care for memory changes  was addressed during this visit. Due to the added complexity in care, I will continue to support Ms Brown in the subsequent management of this condition(s) and with the ongoing continuity of care of this condition(s).      Again, thank you for allowing me to participate in the care of your patient.        Sincerely,        Ankur Sun MD    Electronically signed

## 2025-05-20 NOTE — PROGRESS NOTES
[facility]  Section of General Neurology  Return Patient Visit    Alivia Brown MRN# [id number]   Age: 84 year old YOB: 1940            Assessment and Plan:   Assessment:  Alivia Brown is a pleasant 84 year old female who presents in follow up.  New onset visual changes were found to be secondary to stroke.  Other work up in this regard negative. On aspirin and statin.  Memory changes persist, neuropsychological testing reviewed.  Poor sleep, anxiety/depression big variables.  She is interested in working on these.  Aricept--did derive benefit from but had bad cramping.  Will re trial via rivastigmine at lower dose.  Not clearly a neurodegenerative process on basis of all of our data/testing though certainly possible and worth empirically treating in our mutual estimation.  Other future ideas for work up as below.       Plan:  Sleep:  Sleep referral  Continue melatonin if desired  Add remeron (mirtazapine) 7.5 mg at night, also may help mood    Stroke prevention:  Continue aspirin daily   Lipitor at current dose    Memory:   In a few weeks after starting the Remeron start rivastigmine 1.5 mg twice a day      Future ideas:  Alzheimers blood work  CSF studies  Higher on Paxil or changing selective serotonin reuptake inhibitor      Follow up in ~6 months, sooner with any issues questions or changes      Sal Sun MD   of Neurology   [facility]      Interval history:     Repeat MRI showed occipital lobe stroke -reviewed  Zio patch negative     Neuropsych report reviewed  Discussed options:  psychology, OT, sleep referral  Consideration of alzheimers antibodies    Med had helped but had side effects  Now just seeing puppies but improved    Is taking a baby aspirin daily     Aricept--caused cramping in legs    Did help people going away  Puppy--sees at times, doesn't bother as much     Maybe restless, snoring at night    Sleeping  pill  Trazodone  Remeron  Discussed     Might go higher on paxil in future or changing zoloft       A/P at last visit  Alivia Brown is a pleasant 84 year old female who presents in consultation.  She has had a rather abrupt left sided homonymous hemianopsia and issues with dizziness.  Some of these may have occurred after her initial MRI/MRA, reviewed together and unrevealing (this seemed to have been obtained 2/2 dizziness but visual changes have progressed subsequently).  Discussed her brain did not have alzheimers features (hippocampi looked good) or PCA features (Alzheimers variant).  Given her visuospatial limitations I have suspicion for blossoming lewy body spectrum phenomena but this is confounded by any possible new stroke (need to update MRI to exclude R occipital lobe stroke).   Discussed aricept risk/benefit, designed for limited Alzheimers progression but has lewy body data  Discussed seroquel as a future option, black box warning.   Overall we will gather more data for more diagnostic certainty but also start treatment to try to delay progression of worsening memory, treat symptoms.  MRI to further exclude clear cause of homonymous hemianopsia  MRA reviewed, unlikely to have changed profoundly over interim, will not repeat this portion at this time.      Plan:  Repeat MRI brain w/w/o   If still no answer may consider referral to neuro ophthalmology  Trial of aricept 5 mg at bedtime  Will hold off on seroquel to discussion for now  Neuropsychological testing  RTC in ~4 months, to reach out sooner with any issues questions or changes      Past Medical History:     Patient Active Problem List   Diagnosis    Anxiety state    Benign essential hypertension    Chronic pain    Dry eyes    Hearing loss    Hyperlipidemia    Osteoporosis    Recurrent major depression    Pelvic fracture (H)     Past Medical History:   Diagnosis Date    Acquired hallux valgus 06/12/2021    Anxiety state 6/12/2021    Anxiety state  06/12/2021    Benign essential hypertension 6/12/2021    Benign essential hypertension 06/12/2021    Chronic pain 06/12/2021    Contracture of palmar fascia 06/12/2021    Disorder of right patellofemoral joint 06/12/2021    Dry eyes 06/12/2021    Hearing loss 6/12/2021    Hearing loss 06/12/2021    History of infectious disease 12/11/2020    History of recent fall     Mechanical Fall    HLD (hyperlipidemia)     HTN (hypertension)     Hyperlipidemia 6/12/2021    Hyperlipidemia 06/12/2021    Left hip pain     Osteoporosis 6/12/2021    Osteoporosis 06/12/2021    Pain in right knee 06/12/2021    Parotitis 06/12/2021    Pelvic fracture (H) 06/12/2021    LEFT    Recurrent major depression 6/12/2021    Recurrent major depression 06/12/2021    Urge incontinence of urine 06/12/2021        Past Surgical History:     Past Surgical History:   Procedure Laterality Date    WRIST SURGERY Left     WRIST SURGERY Left         Social History:     Social History     Tobacco Use    Smoking status: Never    Smokeless tobacco: Never   Substance Use Topics    Alcohol use: Yes     Alcohol/week: 3.0 standard drinks of alcohol     Comment: Alcoholic Drinks/day: 3 drinks of vodka per week    Drug use: Never        Family History:   No family history on file.     Medications:     Current Outpatient Medications   Medication Sig Dispense Refill    acetaminophen (TYLENOL) 325 MG tablet Take 2 tablets (650 mg) by mouth every 4 hours as needed for mild pain      alendronate (FOSAMAX) 70 MG tablet Take 70 mg by mouth every 7 days      Ascorbic Acid 500 MG CAPS Take 1 capsule by mouth daily.      atorvastatin (LIPITOR) 10 MG tablet Take 10 mg by mouth daily      CALCIUM PO Take by mouth daily      donepezil (ARICEPT) 5 MG tablet Take 1 tablet (5 mg) by mouth at bedtime. 90 tablet 1    hydroCHLOROthiazide 12.5 MG tablet 1 tablet in the morning Orally Once a day for 90 days      meclizine (ANTIVERT) 25 MG tablet Take 1 tablet (25 mg) by mouth 3 times  "daily as needed for dizziness 20 tablet 0    PARoxetine (PAXIL) 20 MG tablet Take 20 mg by mouth every morning      triamterene-HCTZ (DYAZIDE) 37.5-25 MG capsule Take 1 capsule by mouth every morning      VITAMIN D, CHOLECALCIFEROL, PO Take by mouth daily       No current facility-administered medications for this visit.        Allergies:   No Known Allergies       Physical Exam:   Vitals: /83   Pulse 71   Ht 1.676 m (5' 6\")   Wt 77.2 kg (170 lb 4 oz)   SpO2 100%   BMI 27.48 kg/m     General Appearance: No apparent distress, well-nourished, well-groomed, pleasant      Mental Status: formal testing not repeated        Cranial Nerves:   II: Visual fields: diminished in L visual field/worse in lower quadrant to finger count  III: Pupils: 3 mm, equal, round, reactive to light   III,IV,VI: Extraocular Movements: intact   V: Facial sensation: intact to light touch  VII: Facial strength: intact without asymmetry        Motor Exam:   5/5 diffusely      Sensory: intact to light touch     Coordination: no dysmetria noted     Reflexes: biceps, triceps, brachioradialis, patellar 2+ and symmetric.         Data: Pertinent prior to visit   Imaging  CT HEAD BRAIN WO    Result Date: 9/16/2024  For Patients: As a result of the 21st Century Cures Act, medical imaging exams and procedure reports are released immediately into your electronic medical record. You may view this report before your referring provider. If you have questions, please contact your health care provider. EXAM: CT HEAD BRAIN WO LOCATION: [facility] [place] DATE: 9/16/2024 INDICATION: Dizziness COMPARISON: None. TECHNIQUE: Routine CT Head without IV contrast. Multiplanar reformats. Dose reduction techniques were used. FINDINGS: INTRACRANIAL CONTENTS: No intracranial hemorrhage, extraaxial collection, or mass effect. No CT evidence of acute infarct. Mild presumed chronic small vessel ischemic changes. Mild generalized volume loss. No hydrocephalus. " VISUALIZED ORBITS/SINUSES/MASTOIDS: No intraorbital abnormality. No paranasal sinus mucosal disease. No middle ear or mastoid effusion. BONES/SOFT TISSUES: No acute abnormality.     1. No CT evidence for acute intracranial process. 2. Brain atrophy and presumed chronic microvascular ischemic changes as above.      EXAM: MR BRAIN W/O and W CONTRAST, MRA BRAIN ([place]) W/O CONTRAST, MRA NECK (CAROTIDS) W/O and W CONTRAST  LOCATION: [facility]  DATE: 7/8/2024     INDICATION: 24 hours of dizziness and word finding difficulty, eval for stroke, etc  COMPARISON: CTA head and neck 9/27/2023  CONTRAST: 10 mL Gadavist  TECHNIQUE:   1) Routine multiplanar multisequence head MRI without and with intravenous contrast.  2) 3D time-of-flight head MRA without intravenous contrast.  3) Neck MRA without and with IV contrast. Stenosis measurements made according to NASCET criteria unless otherwise specified.     FINDINGS:  HEAD MRI:  INTRACRANIAL CONTENTS: No acute or subacute infarct. No mass, acute hemorrhage, or extra-axial fluid collections. Patchy nonspecific T2/FLAIR hyperintensities within the cerebral white matter most consistent with mild to moderate chronic microvascular   ischemic change. Mild generalized cerebral atrophy. No hydrocephalus. Normal position of the cerebellar tonsils. No pathologic contrast enhancement.     SELLA: No abnormality accounting for technique.     OSSEOUS STRUCTURES/SOFT TISSUES: Normal marrow signal. The major intracranial vascular flow voids are maintained.      ORBITS: Prior bilateral cataract surgery. Visualized portions of the orbits are otherwise unremarkable.      SINUSES/MASTOIDS: No paranasal sinus mucosal disease. No middle ear or mastoid effusion.      HEAD MRA:   ANTERIOR CIRCULATION: No stenosis/occlusion, aneurysm, or high flow vascular malformation. Fetal origin of both posterior cerebral arteries from the anterior circulation. 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     POSTERIOR CIRCULATION: No stenosis/occlusion, aneurysm, or high flow vascular malformation. Dominant left vertebral artery supplies the basilar artery with a small right vertebral artery supplying the right posterior inferior cerebellar artery (PICA).      NECK MRA:   RIGHT CAROTID: No measurable stenosis or dissection.     LEFT CAROTID: No measurable stenosis or dissection.     VERTEBRAL ARTERIES: No focal stenosis or dissection. Dominant left and smaller right vertebral arteries.     AORTIC ARCH: Classic aortic arch anatomy with no significant stenosis at the origin of the great vessels.                                                                      IMPRESSION:  HEAD MRI:  1.  No recent infarct, intracranial mass or evidence of intracranial hemorrhage.  2.  Mild to moderate presumed chronic small vessel ischemic changes.     HEAD MRA:  1.  No aneurysm, high flow AVM or significant stenosis identified.  2.  Variant [place] anatomy as above.     NECK MRA:  Normal neck MRA.            Newer data:  EXAM: MR BRAIN W/O and W CONTRAST  LOCATION: [facility]  DATE: 1/22/2025     INDICATION: ? of R occipital lobe stroke to explain L HH  COMPARISON: 7/8/2024.  CONTRAST: 7.5 mL Gadavist  TECHNIQUE: Routine multiplanar multisequence head MRI without and with intravenous contrast.     FINDINGS:  INTRACRANIAL CONTENTS: Right inferior occipital region of encephalomalacia/gliosis (axial series 6, images 11-17). No corresponding abnormal diffusion signal, signal loss on susceptibility-weighted images or corresponding postcontrast enhancement.   Finding is new since comparison MRI. No acute or subacute infarct. No mass, acute hemorrhage, or extra-axial fluid collections. Scattered nonspecific T2/FLAIR hyperintensities within the cerebral white matter most consistent with mild chronic   microvascular ischemic change. Mild to  moderate generalized cerebral atrophy. No hydrocephalus. Normal position of the cerebellar tonsils.     SELLA: No abnormality accounting for technique.     OSSEOUS STRUCTURES/SOFT TISSUES: Normal marrow signal. The major intracranial vascular flow voids are maintained.      ORBITS: No abnormality accounting for technique.      SINUSES/MASTOIDS: No paranasal sinus mucosal disease. No middle ear or mastoid effusion.                                                                       IMPRESSION:  1.  Evidence of prior right occipital lobe infarct, as clinically suspected, on a background of presumed sequela chronic microvascular ischemic disease.  2.  Negative for acute intracranial hemorrhage, infarct, hydrocephalus or mass. No pathologic postcontrast enhancement.         Neuropsych data:         SUMMARY OF FINDINGS: (please refer to Extended Report below for full details and comprehensive clinical history)  Results of testing indicate that Ms. Brown is of estimated average premorbid intellectual functioning; however, several of Ms. Brown's performances fall well below that estimate. Specifically, she exhibits mild to severe impairment on measures of executive functioning, including mental flexibility/set shifting, cognitive inhibition, visual planning/organization, and abstract reasoning.  In addition, Ms. Brown's nonverbal (visual) memory retrieval is markedly impaired (despite intact learning of that same material).  Conversely, her verbal learning is [id number] impaired, but her memory of verbal material is fully intact.  The patient's semantic fluency score is mildly impaired and is significantly lower than her average-range phonemic fluency.  There is significant variability in Ms. Brown's processing speed scores (which range from severely impaired to average), and in her attention/concentration (which ranges from mildly impaired to average).  Lastly, several of the patient's  visual-spatial/constructional abilities are also below expectation, including moderately impaired copy of a complex figure, which is notable for significant distortion, a piecemeal approach, omission of several detailed elements, perseveration, and general imprecision.  In addition, her copy of simpler figures is also slightly below expectation in the borderline impaired range.  Her ability to bisect lines on a page reveals a slight shift to the right.     It is important to note that despite the significant visuospatial processing issues noted above, those deficits do not fully explain/account for all of her low test scores.  For example, Ms. Brown performs well on tests of reading, visual scanning (which is quick and without error), and naming visual stimuli.  Furthermore, there are several tasks that are administered via auditory-verbal methods (with no visual stimuli) that were still impaired, including tests of attention/concentration, some tests of processing speed, some tests of executive functioning, and verbal learning.     With regard to learning/memory more specifically, Ms. Brown's learning of nonverbal information is within expectation, but she is unable to recall any of that information over time (0% retention rate).  However, her recall significantly benefits from prompts/cues on the recognition trial, on which her score improves back up to the average range.  This performance suggests a significant retrieval deficit.  In contrast, the patient's verbal learning is slightly below expectation, falling in the [id number] impaired range.  However, her memory of that same information is fully intact.     All other cognitive test performances are within normal limits, including those on all other measures of language processing (e.g., confrontation naming, single-word reading), visuoconstruction using three-dimensional blocks, phonemic fluency, and (as noted above) visual scanning, nonverbal  learning efficiency, and verbal memory.     Emotionally, the patient endorses moderate depressive symptoms and mild anxiety on self-report questionnaires.  She acknowledges that she typically becomes more depressed in the winter when there is less to do and she stays home more often.  She prefers to be more social.  Her  denied having any significant concerns about the patient's emotional wellbeing.  He has also not observed any significant personality change.     IMPRESSIONS:  Overall, these results are suggestive of mild-to-moderate bilateral frontal/subcortical dysfunction.  Although her visuospatial/constructional skills are below expectation and could plicate the nondominant parietal lobe, these abilities might also be affected by her fairly recent right occipital lobe stroke.  Despite these current cognitive difficulties, Ms. Brown remains fairly independent in daily life.  As such, she currently meets criteria for Mild Neurocognitive Disorder.  Etiology is somewhat uncertain, as this type of cognitive profile is fairly nonspecific.  That said, I suspect that there may be a combination of neurologic and possibly non-neurologic factors at play, including:  Ongoing sequelae from her right occipital stroke, which could contribute to some of her visuospatial/constructional deficits and possibly her visual hallucinations that have been occurring less frequently over time.  However, this stroke would not fully explain all of her cognitive impairments. If the stroke is contributing to some of her visuoconstructional deficits and hallucinations, then she may continue to experience ongoing improvement in those symptoms over time.  Recovery from stroke typically starts to plateau after about a year.  Other cerebrovascular disease (chronic small vessel ischemic changes) could also potentially account for some of her frontal/subcortical dysfunction (her 2024 MRI noted a mild-to-moderate degree of chronic small  vessel ischemia, although her 2025 MRI only categorized as mild).  Possible RAMIREZ/sleep-disordered breathing, as reported by her  who sleeps in a separate room.  If the patient has untreated sleep apnea, this could contribute to frontal/subcortical deficits, visuoconstructional impairment, and even active sleeping.  Treatment of RAMIREZ (if present) could elicit some degree of improvement in these symptoms over time.  Moderately depressed mood and anxiety may exacerbate her cognitive difficulties to some degree, and can contribute to variability in her cognitive performance, both on testing and in daily life.  Improvements in her mood could also lead to some improvements in her cognitive functioning.  The patient also reports a history of possible verbal learning disability, as she always struggled with learning how to read and write.  This could potentially explain some of her verbal learning inefficiencies on testing.  However, assessing for learning disabilities is outside of the scope of this evaluation.  Although Ms. Brown has some symptoms that are consistent with a Lewy body dementia (LBD) process (e.g., frontal/subcortical dysfunction, visuospatial/constructional impairments, visual hallucinations, tremor, and possible REM sleep behavior disorder reported by her ) these symptoms are all fairly nonspecific and could be fully explained by the other factors mentioned above.  In addition, many of the clinical characteristics began acutely, presumably at the time of her stroke, which might argue against an emerging LBD process (which typically onsets gradually). That said, ongoing monitoring would be appropriate.  At this time, there is no compelling evidence of an Alzheimer's disease process.  Her profile is also not strongly supportive of posterior cortical atrophy (and again, her recent occipital stroke could fully account for some of those symptoms she is demonstrating).     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      If not medically contraindicated, Ms. Brown may benefit from a trial of a medication for her cognitive impairment (e.g., rivastigmine, since donepezil was not well tolerated).  Some studies suggest that rivastigmine has shown potential benefits in folks with vascular cognitive impairment and in LBD (although it is less likely that the patient has LBD at this time).  This recommendation will be deferred to her neurologist.     Consider a referral to Sleep Medicine in order to evaluate for sleep disordered breathing/RAMIREZ and for possible REM sleep behavior disorder. This recommendation will be deferred to the patient's neurologist or PCP.     The patient may benefit from establishing care with our psychologist, Yaritza Bowen PsyD, LP ([facility]; [phone]). Dr. Bowen specializes in working with older adults with cognitive issues as well as their loved ones. If the patient is amenable, I will have our schedulers call her to set up an intake appointment.     Consider an adjustment to the patient's psychiatric medication to help better manage her depression/anxiety.     Because of her executive dysfunction, occasional oversight with complex daily activities would be appropriate, particularly with regard to cooking, medication management, and financial management.  The patient stopped driving after her stroke, which remains appropriate (at least for now) in light of her visuospatial impairment, executive dysfunction, and significant variability in attention and processing speed.     Consider a referral to Occupational Therapy for a CPT assessment, which may help clarify her functional capabilities and clarify the level of  support needed at home.  Occupational Therapy could also help the patient compensate for her visuospatial difficulties and other cognitive deficits.     Ms. Brown reported taking a sleep aid nightly that contains doxylamine, which has anticholinergic properties that can affect memory. She is encouraged to discontinue this medication and re-try melatonin, or talk with her doctor about a prescription for sleep initiation if needed.     The patient is encouraged to utilize cognitive compensatory strategies in daily life, including utilizing note pads, checklists, to-do lists, a calendar/planner, labeled alarm reminders, a GPS, a pillbox, and maintaining a daily morning and nighttime routine and an organized living/work environment. Performing important/complex tasks or having important conversations should be done in a quiet, distraction-free environment (this includes putting away the cell phone, turning off the TV or music in the background, etc.).      Ms. Brown is strongly encouraged to adhere closely to her medication regimen to help keep her cerebrovascular risk factors (e.g., hypertension, hyperlipidemia, etc.) well controlled. This may help to prevent further cognitive decline in the future.     Ms. Brown is encouraged to remain physically, socially, and mentally active in order to optimize her brain health.     Neuropsychological follow-up is recommended in 12-18 months (or as clinically indicated) in order to monitor her cognitive status, help to clarify etiology, and update recommendations. The current test data can be used as a baseline to which future comparisons can be made.           Thank you for allowing me to participate in Ms. Brown's care. Please contact me with any questions regarding the content of this report.          Dinah Grajeda PsyD, RIO  Clinical Neuropsychologist  [facility] [address], [address] [facility]  Phone: [phone]  Fax: [phone]        The total time of this encounter today amounted to 42 minutes. This time included time spent with the patient, prep work, ordering tests, and performing post visit documentation.    The longitudinal plan of care for memory changes  was addressed during this visit. Due to the added complexity in care, I will continue to support Ms Brown in the subsequent management of this condition(s) and with the ongoing continuity of care of this condition(s).

## 2025-05-21 ENCOUNTER — PATIENT OUTREACH (OUTPATIENT)
Dept: CARE COORDINATION | Facility: CLINIC | Age: 85
End: 2025-05-21
Payer: MEDICARE

## 2025-06-12 ENCOUNTER — TRANSFERRED RECORDS (OUTPATIENT)
Dept: HEALTH INFORMATION MANAGEMENT | Facility: CLINIC | Age: 85
End: 2025-06-12
Payer: MEDICARE